# Patient Record
Sex: FEMALE | Race: OTHER | HISPANIC OR LATINO
[De-identification: names, ages, dates, MRNs, and addresses within clinical notes are randomized per-mention and may not be internally consistent; named-entity substitution may affect disease eponyms.]

---

## 2019-01-22 PROBLEM — Z00.00 ENCOUNTER FOR PREVENTIVE HEALTH EXAMINATION: Status: ACTIVE | Noted: 2019-01-22

## 2019-01-24 ENCOUNTER — APPOINTMENT (OUTPATIENT)
Dept: PULMONOLOGY | Facility: CLINIC | Age: 69
End: 2019-01-24

## 2019-07-05 ENCOUNTER — APPOINTMENT (OUTPATIENT)
Dept: CARDIOLOGY | Facility: CLINIC | Age: 69
End: 2019-07-05

## 2021-01-01 ENCOUNTER — APPOINTMENT (OUTPATIENT)
Dept: DISASTER EMERGENCY | Facility: OTHER | Age: 71
End: 2021-01-01
Payer: MEDICARE

## 2021-01-01 ENCOUNTER — APPOINTMENT (OUTPATIENT)
Dept: HEART FAILURE | Facility: CLINIC | Age: 71
End: 2021-01-01
Payer: MEDICARE

## 2021-01-01 ENCOUNTER — APPOINTMENT (OUTPATIENT)
Dept: CARDIOTHORACIC SURGERY | Facility: CLINIC | Age: 71
End: 2021-01-01
Payer: MEDICARE

## 2021-01-01 ENCOUNTER — APPOINTMENT (OUTPATIENT)
Dept: HEART FAILURE | Facility: CLINIC | Age: 71
End: 2021-01-01

## 2021-01-01 ENCOUNTER — NON-APPOINTMENT (OUTPATIENT)
Age: 71
End: 2021-01-01

## 2021-01-01 ENCOUNTER — APPOINTMENT (OUTPATIENT)
Dept: CARDIOLOGY | Facility: CLINIC | Age: 71
End: 2021-01-01
Payer: MEDICARE

## 2021-01-01 ENCOUNTER — TRANSCRIPTION ENCOUNTER (OUTPATIENT)
Age: 71
End: 2021-01-01

## 2021-01-01 ENCOUNTER — APPOINTMENT (OUTPATIENT)
Dept: CARE COORDINATION | Facility: HOME HEALTH | Age: 71
End: 2021-01-01
Payer: MEDICARE

## 2021-01-01 ENCOUNTER — INPATIENT (INPATIENT)
Facility: HOSPITAL | Age: 71
LOS: 13 days | Discharge: HOME HEALTH | DRG: 291 | End: 2021-10-14
Attending: HOSPITALIST | Admitting: STUDENT IN AN ORGANIZED HEALTH CARE EDUCATION/TRAINING PROGRAM
Payer: MEDICARE

## 2021-01-01 VITALS
OXYGEN SATURATION: 97 % | RESPIRATION RATE: 18 BRPM | HEART RATE: 75 BPM | DIASTOLIC BLOOD PRESSURE: 64 MMHG | TEMPERATURE: 98.5 F | BODY MASS INDEX: 36.19 KG/M2 | HEIGHT: 64 IN | SYSTOLIC BLOOD PRESSURE: 108 MMHG | WEIGHT: 212 LBS

## 2021-01-01 VITALS
DIASTOLIC BLOOD PRESSURE: 65 MMHG | OXYGEN SATURATION: 98 % | TEMPERATURE: 97.5 F | SYSTOLIC BLOOD PRESSURE: 118 MMHG | RESPIRATION RATE: 16 BRPM | HEART RATE: 66 BPM

## 2021-01-01 VITALS
RESPIRATION RATE: 17 BRPM | HEART RATE: 68 BPM | SYSTOLIC BLOOD PRESSURE: 108 MMHG | OXYGEN SATURATION: 94 % | TEMPERATURE: 98 F | DIASTOLIC BLOOD PRESSURE: 72 MMHG

## 2021-01-01 VITALS
WEIGHT: 218 LBS | HEIGHT: 64 IN | HEART RATE: 74 BPM | TEMPERATURE: 97.8 F | OXYGEN SATURATION: 96 % | SYSTOLIC BLOOD PRESSURE: 102 MMHG | DIASTOLIC BLOOD PRESSURE: 71 MMHG | BODY MASS INDEX: 37.22 KG/M2 | RESPIRATION RATE: 16 BRPM

## 2021-01-01 VITALS
TEMPERATURE: 98.1 F | RESPIRATION RATE: 16 BRPM | HEART RATE: 68 BPM | DIASTOLIC BLOOD PRESSURE: 62 MMHG | SYSTOLIC BLOOD PRESSURE: 107 MMHG | HEIGHT: 64 IN | BODY MASS INDEX: 36.37 KG/M2 | WEIGHT: 213 LBS | OXYGEN SATURATION: 93 %

## 2021-01-01 VITALS
HEART RATE: 92 BPM | BODY MASS INDEX: 36.22 KG/M2 | WEIGHT: 211 LBS | DIASTOLIC BLOOD PRESSURE: 69 MMHG | SYSTOLIC BLOOD PRESSURE: 106 MMHG

## 2021-01-01 VITALS — HEIGHT: 63 IN | BODY MASS INDEX: 38.62 KG/M2 | WEIGHT: 218 LBS

## 2021-01-01 VITALS
SYSTOLIC BLOOD PRESSURE: 152 MMHG | RESPIRATION RATE: 21 BRPM | OXYGEN SATURATION: 100 % | DIASTOLIC BLOOD PRESSURE: 92 MMHG | WEIGHT: 218.04 LBS | HEART RATE: 72 BPM | TEMPERATURE: 100 F | HEIGHT: 64 IN

## 2021-01-01 VITALS — WEIGHT: 205 LBS | HEIGHT: 64 IN | BODY MASS INDEX: 35 KG/M2

## 2021-01-01 DIAGNOSIS — M25.572 PAIN IN LEFT ANKLE AND JOINTS OF LEFT FOOT: ICD-10-CM

## 2021-01-01 DIAGNOSIS — Z78.9 OTHER SPECIFIED HEALTH STATUS: ICD-10-CM

## 2021-01-01 DIAGNOSIS — I10 ESSENTIAL (PRIMARY) HYPERTENSION: ICD-10-CM

## 2021-01-01 DIAGNOSIS — I05.0 RHEUMATIC MITRAL STENOSIS: ICD-10-CM

## 2021-01-01 DIAGNOSIS — I50.33 ACUTE ON CHRONIC DIASTOLIC (CONGESTIVE) HEART FAILURE: ICD-10-CM

## 2021-01-01 DIAGNOSIS — Z83.3 FAMILY HISTORY OF DIABETES MELLITUS: ICD-10-CM

## 2021-01-01 DIAGNOSIS — K31.819 ANGIODYSPLASIA OF STOMACH AND DUODENUM W/OUT BLEEDING: ICD-10-CM

## 2021-01-01 DIAGNOSIS — E78.5 HYPERLIPIDEMIA, UNSPECIFIED: ICD-10-CM

## 2021-01-01 DIAGNOSIS — I35.0 NONRHEUMATIC AORTIC (VALVE) STENOSIS: ICD-10-CM

## 2021-01-01 DIAGNOSIS — Z87.19 PERSONAL HISTORY OF OTHER DISEASES OF THE DIGESTIVE SYSTEM: ICD-10-CM

## 2021-01-01 DIAGNOSIS — R06.02 SHORTNESS OF BREATH: ICD-10-CM

## 2021-01-01 DIAGNOSIS — Z29.9 ENCOUNTER FOR PROPHYLACTIC MEASURES, UNSPECIFIED: ICD-10-CM

## 2021-01-01 DIAGNOSIS — I07.1 RHEUMATIC TRICUSPID INSUFFICIENCY: ICD-10-CM

## 2021-01-01 DIAGNOSIS — K21.9 GASTRO-ESOPHAGEAL REFLUX DISEASE W/OUT ESOPHAGITIS: ICD-10-CM

## 2021-01-01 DIAGNOSIS — K92.2 GASTROINTESTINAL HEMORRHAGE, UNSPECIFIED: ICD-10-CM

## 2021-01-01 DIAGNOSIS — M10.9 GOUT, UNSPECIFIED: ICD-10-CM

## 2021-01-01 DIAGNOSIS — K31.819 ANGIODYSPLASIA OF STOMACH AND DUODENUM WITHOUT BLEEDING: ICD-10-CM

## 2021-01-01 DIAGNOSIS — E87.8 OTHER DISORDERS OF ELECTROLYTE AND FLUID BALANCE, NOT ELSEWHERE CLASSIFIED: ICD-10-CM

## 2021-01-01 DIAGNOSIS — N17.9 ACUTE KIDNEY FAILURE, UNSPECIFIED: ICD-10-CM

## 2021-01-01 DIAGNOSIS — J96.01 ACUTE RESPIRATORY FAILURE WITH HYPOXIA: ICD-10-CM

## 2021-01-01 DIAGNOSIS — I50.9 HEART FAILURE, UNSPECIFIED: ICD-10-CM

## 2021-01-01 DIAGNOSIS — R50.9 FEVER, UNSPECIFIED: ICD-10-CM

## 2021-01-01 LAB
A1C WITH ESTIMATED AVERAGE GLUCOSE RESULT: 5.8 % — HIGH (ref 4–5.6)
ALBUMIN SERPL ELPH-MCNC: 3.6 G/DL — SIGNIFICANT CHANGE UP (ref 3.3–5)
ALBUMIN SERPL ELPH-MCNC: 3.8 G/DL — SIGNIFICANT CHANGE UP (ref 3.3–5)
ALBUMIN SERPL ELPH-MCNC: 3.9 G/DL — SIGNIFICANT CHANGE UP (ref 3.3–5)
ALBUMIN SERPL ELPH-MCNC: 3.9 G/DL — SIGNIFICANT CHANGE UP (ref 3.3–5)
ALBUMIN SERPL ELPH-MCNC: 4 G/DL
ALBUMIN SERPL ELPH-MCNC: 4 G/DL — SIGNIFICANT CHANGE UP (ref 3.3–5)
ALP BLD-CCNC: 132 U/L
ALP SERPL-CCNC: 132 U/L — HIGH (ref 40–120)
ALP SERPL-CCNC: 149 U/L — HIGH (ref 40–120)
ALP SERPL-CCNC: 154 U/L — HIGH (ref 40–120)
ALP SERPL-CCNC: 156 U/L — HIGH (ref 40–120)
ALP SERPL-CCNC: 184 U/L — HIGH (ref 40–120)
ALT FLD-CCNC: 15 U/L — SIGNIFICANT CHANGE UP (ref 10–45)
ALT FLD-CCNC: 18 U/L — SIGNIFICANT CHANGE UP (ref 10–45)
ALT FLD-CCNC: 21 U/L — SIGNIFICANT CHANGE UP (ref 10–45)
ALT FLD-CCNC: 22 U/L — SIGNIFICANT CHANGE UP (ref 10–45)
ALT FLD-CCNC: 31 U/L — SIGNIFICANT CHANGE UP (ref 10–45)
ALT SERPL-CCNC: 9 U/L
ANION GAP SERPL CALC-SCNC: 13 MMOL/L — SIGNIFICANT CHANGE UP (ref 5–17)
ANION GAP SERPL CALC-SCNC: 14 MMOL/L — SIGNIFICANT CHANGE UP (ref 5–17)
ANION GAP SERPL CALC-SCNC: 15 MMOL/L — SIGNIFICANT CHANGE UP (ref 5–17)
ANION GAP SERPL CALC-SCNC: 15 MMOL/L — SIGNIFICANT CHANGE UP (ref 5–17)
ANION GAP SERPL CALC-SCNC: 16 MMOL/L — SIGNIFICANT CHANGE UP (ref 5–17)
ANION GAP SERPL CALC-SCNC: 17 MMOL/L
ANION GAP SERPL CALC-SCNC: 17 MMOL/L — SIGNIFICANT CHANGE UP (ref 5–17)
ANION GAP SERPL CALC-SCNC: 17 MMOL/L — SIGNIFICANT CHANGE UP (ref 5–17)
ANION GAP SERPL CALC-SCNC: 18 MMOL/L — HIGH (ref 5–17)
ANION GAP SERPL CALC-SCNC: 19 MMOL/L — HIGH (ref 5–17)
ANION GAP SERPL CALC-SCNC: 19 MMOL/L — HIGH (ref 5–17)
APPEARANCE UR: CLEAR — SIGNIFICANT CHANGE UP
APTT BLD: 32.5 SEC — SIGNIFICANT CHANGE UP (ref 27.5–35.5)
APTT BLD: 33.6 SEC — SIGNIFICANT CHANGE UP (ref 27.5–35.5)
AST SERPL-CCNC: 15 U/L
AST SERPL-CCNC: 20 U/L — SIGNIFICANT CHANGE UP (ref 10–40)
AST SERPL-CCNC: 23 U/L — SIGNIFICANT CHANGE UP (ref 10–40)
AST SERPL-CCNC: 30 U/L — SIGNIFICANT CHANGE UP (ref 10–40)
AST SERPL-CCNC: 35 U/L — SIGNIFICANT CHANGE UP (ref 10–40)
AST SERPL-CCNC: 36 U/L — SIGNIFICANT CHANGE UP (ref 10–40)
BACTERIA # UR AUTO: NEGATIVE — SIGNIFICANT CHANGE UP
BASE EXCESS BLDV CALC-SCNC: -2.7 MMOL/L — LOW (ref -2–2)
BASE EXCESS BLDV CALC-SCNC: -4.3 MMOL/L — LOW (ref -2–2)
BASOPHILS # BLD AUTO: 0.03 K/UL — SIGNIFICANT CHANGE UP (ref 0–0.2)
BASOPHILS # BLD AUTO: 0.04 K/UL
BASOPHILS NFR BLD AUTO: 0.5 %
BASOPHILS NFR BLD AUTO: 0.5 % — SIGNIFICANT CHANGE UP (ref 0–2)
BILIRUB SERPL-MCNC: 0.4 MG/DL — SIGNIFICANT CHANGE UP (ref 0.2–1.2)
BILIRUB SERPL-MCNC: 0.4 MG/DL — SIGNIFICANT CHANGE UP (ref 0.2–1.2)
BILIRUB SERPL-MCNC: 0.5 MG/DL — SIGNIFICANT CHANGE UP (ref 0.2–1.2)
BILIRUB SERPL-MCNC: 0.6 MG/DL
BILIRUB SERPL-MCNC: 0.6 MG/DL — SIGNIFICANT CHANGE UP (ref 0.2–1.2)
BILIRUB SERPL-MCNC: 0.8 MG/DL — SIGNIFICANT CHANGE UP (ref 0.2–1.2)
BILIRUB UR-MCNC: NEGATIVE — SIGNIFICANT CHANGE UP
BLD GP AB SCN SERPL QL: NEGATIVE — SIGNIFICANT CHANGE UP
BUN SERPL-MCNC: 32 MG/DL — HIGH (ref 7–23)
BUN SERPL-MCNC: 32 MG/DL — HIGH (ref 7–23)
BUN SERPL-MCNC: 33 MG/DL — HIGH (ref 7–23)
BUN SERPL-MCNC: 38 MG/DL
BUN SERPL-MCNC: 39 MG/DL — HIGH (ref 7–23)
BUN SERPL-MCNC: 48 MG/DL — HIGH (ref 7–23)
BUN SERPL-MCNC: 50 MG/DL — HIGH (ref 7–23)
BUN SERPL-MCNC: 52 MG/DL — HIGH (ref 7–23)
BUN SERPL-MCNC: 53 MG/DL — HIGH (ref 7–23)
BUN SERPL-MCNC: 54 MG/DL — HIGH (ref 7–23)
BUN SERPL-MCNC: 57 MG/DL — HIGH (ref 7–23)
BUN SERPL-MCNC: 57 MG/DL — HIGH (ref 7–23)
BUN SERPL-MCNC: 62 MG/DL — HIGH (ref 7–23)
BUN SERPL-MCNC: 64 MG/DL — HIGH (ref 7–23)
BUN SERPL-MCNC: 66 MG/DL — HIGH (ref 7–23)
CA-I SERPL-SCNC: 1.15 MMOL/L — SIGNIFICANT CHANGE UP (ref 1.15–1.33)
CA-I SERPL-SCNC: 1.17 MMOL/L — SIGNIFICANT CHANGE UP (ref 1.15–1.33)
CALCIUM SERPL-MCNC: 9 MG/DL — SIGNIFICANT CHANGE UP (ref 8.4–10.5)
CALCIUM SERPL-MCNC: 9 MG/DL — SIGNIFICANT CHANGE UP (ref 8.4–10.5)
CALCIUM SERPL-MCNC: 9.2 MG/DL — SIGNIFICANT CHANGE UP (ref 8.4–10.5)
CALCIUM SERPL-MCNC: 9.2 MG/DL — SIGNIFICANT CHANGE UP (ref 8.4–10.5)
CALCIUM SERPL-MCNC: 9.3 MG/DL — SIGNIFICANT CHANGE UP (ref 8.4–10.5)
CALCIUM SERPL-MCNC: 9.4 MG/DL — SIGNIFICANT CHANGE UP (ref 8.4–10.5)
CALCIUM SERPL-MCNC: 9.5 MG/DL
CALCIUM SERPL-MCNC: 9.5 MG/DL — SIGNIFICANT CHANGE UP (ref 8.4–10.5)
CALCIUM SERPL-MCNC: 9.5 MG/DL — SIGNIFICANT CHANGE UP (ref 8.4–10.5)
CALCIUM SERPL-MCNC: 9.6 MG/DL — SIGNIFICANT CHANGE UP (ref 8.4–10.5)
CALCIUM SERPL-MCNC: 9.6 MG/DL — SIGNIFICANT CHANGE UP (ref 8.4–10.5)
CALCIUM SERPL-MCNC: 9.7 MG/DL — SIGNIFICANT CHANGE UP (ref 8.4–10.5)
CALCIUM SERPL-MCNC: 9.7 MG/DL — SIGNIFICANT CHANGE UP (ref 8.4–10.5)
CHLORIDE BLDV-SCNC: 109 MMOL/L — HIGH (ref 96–108)
CHLORIDE BLDV-SCNC: 111 MMOL/L — HIGH (ref 96–108)
CHLORIDE SERPL-SCNC: 102 MMOL/L — SIGNIFICANT CHANGE UP (ref 96–108)
CHLORIDE SERPL-SCNC: 104 MMOL/L — SIGNIFICANT CHANGE UP (ref 96–108)
CHLORIDE SERPL-SCNC: 105 MMOL/L — SIGNIFICANT CHANGE UP (ref 96–108)
CHLORIDE SERPL-SCNC: 106 MMOL/L — SIGNIFICANT CHANGE UP (ref 96–108)
CHLORIDE SERPL-SCNC: 106 MMOL/L — SIGNIFICANT CHANGE UP (ref 96–108)
CHLORIDE SERPL-SCNC: 107 MMOL/L
CHLORIDE SERPL-SCNC: 107 MMOL/L — SIGNIFICANT CHANGE UP (ref 96–108)
CHLORIDE SERPL-SCNC: 112 MMOL/L — HIGH (ref 96–108)
CHLORIDE SERPL-SCNC: 112 MMOL/L — HIGH (ref 96–108)
CHLORIDE SERPL-SCNC: 113 MMOL/L — HIGH (ref 96–108)
CHOLEST SERPL-MCNC: 108 MG/DL — SIGNIFICANT CHANGE UP
CHOLEST SERPL-MCNC: 113 MG/DL
CO2 BLDV-SCNC: 23 MMOL/L — SIGNIFICANT CHANGE UP (ref 22–26)
CO2 BLDV-SCNC: 24 MMOL/L — SIGNIFICANT CHANGE UP (ref 22–26)
CO2 SERPL-SCNC: 18 MMOL/L — LOW (ref 22–31)
CO2 SERPL-SCNC: 19 MMOL/L — LOW (ref 22–31)
CO2 SERPL-SCNC: 20 MMOL/L — LOW (ref 22–31)
CO2 SERPL-SCNC: 21 MMOL/L — LOW (ref 22–31)
CO2 SERPL-SCNC: 22 MMOL/L
CO2 SERPL-SCNC: 22 MMOL/L — SIGNIFICANT CHANGE UP (ref 22–31)
COLOR SPEC: SIGNIFICANT CHANGE UP
COLOR SPEC: YELLOW — SIGNIFICANT CHANGE UP
COLOR SPEC: YELLOW — SIGNIFICANT CHANGE UP
COVID-19 SPIKE DOMAIN AB INTERP: POSITIVE
COVID-19 SPIKE DOMAIN ANTIBODY RESULT: >250 U/ML — HIGH
CREAT SERPL-MCNC: 1.39 MG/DL — HIGH (ref 0.5–1.3)
CREAT SERPL-MCNC: 1.42 MG/DL — HIGH (ref 0.5–1.3)
CREAT SERPL-MCNC: 1.46 MG/DL — HIGH (ref 0.5–1.3)
CREAT SERPL-MCNC: 1.46 MG/DL — HIGH (ref 0.5–1.3)
CREAT SERPL-MCNC: 1.5 MG/DL — HIGH (ref 0.5–1.3)
CREAT SERPL-MCNC: 1.52 MG/DL — HIGH (ref 0.5–1.3)
CREAT SERPL-MCNC: 1.52 MG/DL — HIGH (ref 0.5–1.3)
CREAT SERPL-MCNC: 1.53 MG/DL — HIGH (ref 0.5–1.3)
CREAT SERPL-MCNC: 1.53 MG/DL — HIGH (ref 0.5–1.3)
CREAT SERPL-MCNC: 1.58 MG/DL
CREAT SERPL-MCNC: 1.58 MG/DL — HIGH (ref 0.5–1.3)
CREAT SERPL-MCNC: 1.6 MG/DL — HIGH (ref 0.5–1.3)
CREAT SERPL-MCNC: 1.6 MG/DL — HIGH (ref 0.5–1.3)
CREAT SERPL-MCNC: 1.62 MG/DL — HIGH (ref 0.5–1.3)
CREAT SERPL-MCNC: 1.69 MG/DL — HIGH (ref 0.5–1.3)
CREAT SERPL-MCNC: 1.71 MG/DL — HIGH (ref 0.5–1.3)
CREAT SERPL-MCNC: 2.02 MG/DL — HIGH (ref 0.5–1.3)
CRP SERPL-MCNC: 70 MG/L — HIGH (ref 0–4)
CULTURE RESULTS: SIGNIFICANT CHANGE UP
D DIMER BLD IA.RAPID-MCNC: 327 NG/ML DDU — HIGH
DIFF PNL FLD: ABNORMAL
DIFF PNL FLD: NEGATIVE — SIGNIFICANT CHANGE UP
DIFF PNL FLD: NEGATIVE — SIGNIFICANT CHANGE UP
EOSINOPHIL # BLD AUTO: 0.22 K/UL
EOSINOPHIL # BLD AUTO: 0.29 K/UL — SIGNIFICANT CHANGE UP (ref 0–0.5)
EOSINOPHIL NFR BLD AUTO: 2.7 %
EOSINOPHIL NFR BLD AUTO: 4.4 % — SIGNIFICANT CHANGE UP (ref 0–6)
EPI CELLS # UR: 1 /HPF — SIGNIFICANT CHANGE UP
ERYTHROCYTE [SEDIMENTATION RATE] IN BLOOD: 103 MM/HR — HIGH (ref 0–20)
ERYTHROCYTE [SEDIMENTATION RATE] IN BLOOD: 120 MM/HR — HIGH (ref 0–20)
ESTIMATED AVERAGE GLUCOSE: 120 MG/DL — HIGH (ref 68–114)
ESTIMATED AVERAGE GLUCOSE: 131 MG/DL
FERRITIN SERPL-MCNC: 51 NG/ML
GAS PNL BLDV: 144 MMOL/L — SIGNIFICANT CHANGE UP (ref 136–145)
GAS PNL BLDV: 145 MMOL/L — SIGNIFICANT CHANGE UP (ref 136–145)
GAS PNL BLDV: SIGNIFICANT CHANGE UP
GLUCOSE BLDV-MCNC: 111 MG/DL — HIGH (ref 70–99)
GLUCOSE BLDV-MCNC: 127 MG/DL — HIGH (ref 70–99)
GLUCOSE SERPL-MCNC: 102 MG/DL — HIGH (ref 70–99)
GLUCOSE SERPL-MCNC: 105 MG/DL — HIGH (ref 70–99)
GLUCOSE SERPL-MCNC: 107 MG/DL — HIGH (ref 70–99)
GLUCOSE SERPL-MCNC: 107 MG/DL — HIGH (ref 70–99)
GLUCOSE SERPL-MCNC: 108 MG/DL — HIGH (ref 70–99)
GLUCOSE SERPL-MCNC: 109 MG/DL — HIGH (ref 70–99)
GLUCOSE SERPL-MCNC: 112 MG/DL — HIGH (ref 70–99)
GLUCOSE SERPL-MCNC: 118 MG/DL — HIGH (ref 70–99)
GLUCOSE SERPL-MCNC: 120 MG/DL — HIGH (ref 70–99)
GLUCOSE SERPL-MCNC: 130 MG/DL — HIGH (ref 70–99)
GLUCOSE SERPL-MCNC: 131 MG/DL — HIGH (ref 70–99)
GLUCOSE SERPL-MCNC: 131 MG/DL — HIGH (ref 70–99)
GLUCOSE SERPL-MCNC: 159 MG/DL — HIGH (ref 70–99)
GLUCOSE SERPL-MCNC: 88 MG/DL — SIGNIFICANT CHANGE UP (ref 70–99)
GLUCOSE SERPL-MCNC: 93 MG/DL
GLUCOSE SERPL-MCNC: 96 MG/DL — SIGNIFICANT CHANGE UP (ref 70–99)
GLUCOSE SERPL-MCNC: 97 MG/DL — SIGNIFICANT CHANGE UP (ref 70–99)
GLUCOSE UR QL: NEGATIVE — SIGNIFICANT CHANGE UP
HBA1C MFR BLD HPLC: 6.2 %
HCO3 BLDV-SCNC: 22 MMOL/L — SIGNIFICANT CHANGE UP (ref 22–29)
HCO3 BLDV-SCNC: 23 MMOL/L — SIGNIFICANT CHANGE UP (ref 22–29)
HCT VFR BLD CALC: 35.5 % — SIGNIFICANT CHANGE UP (ref 34.5–45)
HCT VFR BLD CALC: 35.8 % — SIGNIFICANT CHANGE UP (ref 34.5–45)
HCT VFR BLD CALC: 35.8 % — SIGNIFICANT CHANGE UP (ref 34.5–45)
HCT VFR BLD CALC: 36.6 % — SIGNIFICANT CHANGE UP (ref 34.5–45)
HCT VFR BLD CALC: 37.2 % — SIGNIFICANT CHANGE UP (ref 34.5–45)
HCT VFR BLD CALC: 37.3 % — SIGNIFICANT CHANGE UP (ref 34.5–45)
HCT VFR BLD CALC: 37.5 % — SIGNIFICANT CHANGE UP (ref 34.5–45)
HCT VFR BLD CALC: 37.9 % — SIGNIFICANT CHANGE UP (ref 34.5–45)
HCT VFR BLD CALC: 38.1 % — SIGNIFICANT CHANGE UP (ref 34.5–45)
HCT VFR BLD CALC: 38.2 % — SIGNIFICANT CHANGE UP (ref 34.5–45)
HCT VFR BLD CALC: 38.6 % — SIGNIFICANT CHANGE UP (ref 34.5–45)
HCT VFR BLD CALC: 39.2 % — SIGNIFICANT CHANGE UP (ref 34.5–45)
HCT VFR BLD CALC: 39.2 % — SIGNIFICANT CHANGE UP (ref 34.5–45)
HCT VFR BLD CALC: 42 % — SIGNIFICANT CHANGE UP (ref 34.5–45)
HCT VFR BLD CALC: 44.3 %
HCT VFR BLDA CALC: 36 % — SIGNIFICANT CHANGE UP (ref 34.5–46.5)
HCT VFR BLDA CALC: 37 % — SIGNIFICANT CHANGE UP (ref 34.5–46.5)
HCV AB S/CO SERPL IA: 0.12 S/CO — SIGNIFICANT CHANGE UP (ref 0–0.99)
HCV AB SERPL-IMP: SIGNIFICANT CHANGE UP
HDLC SERPL-MCNC: 35 MG/DL — LOW
HDLC SERPL-MCNC: 37 MG/DL
HGB BLD CALC-MCNC: 11.9 G/DL — SIGNIFICANT CHANGE UP (ref 11.7–16.1)
HGB BLD CALC-MCNC: 12.3 G/DL — SIGNIFICANT CHANGE UP (ref 11.7–16.1)
HGB BLD-MCNC: 11 G/DL — LOW (ref 11.5–15.5)
HGB BLD-MCNC: 11 G/DL — LOW (ref 11.5–15.5)
HGB BLD-MCNC: 11.1 G/DL — LOW (ref 11.5–15.5)
HGB BLD-MCNC: 11.1 G/DL — LOW (ref 11.5–15.5)
HGB BLD-MCNC: 11.2 G/DL — LOW (ref 11.5–15.5)
HGB BLD-MCNC: 11.3 G/DL — LOW (ref 11.5–15.5)
HGB BLD-MCNC: 11.6 G/DL — SIGNIFICANT CHANGE UP (ref 11.5–15.5)
HGB BLD-MCNC: 11.6 G/DL — SIGNIFICANT CHANGE UP (ref 11.5–15.5)
HGB BLD-MCNC: 11.8 G/DL — SIGNIFICANT CHANGE UP (ref 11.5–15.5)
HGB BLD-MCNC: 11.9 G/DL — SIGNIFICANT CHANGE UP (ref 11.5–15.5)
HGB BLD-MCNC: 11.9 G/DL — SIGNIFICANT CHANGE UP (ref 11.5–15.5)
HGB BLD-MCNC: 12 G/DL — SIGNIFICANT CHANGE UP (ref 11.5–15.5)
HGB BLD-MCNC: 12.1 G/DL — SIGNIFICANT CHANGE UP (ref 11.5–15.5)
HGB BLD-MCNC: 13 G/DL
HGB BLD-MCNC: 13.1 G/DL — SIGNIFICANT CHANGE UP (ref 11.5–15.5)
HYALINE CASTS # UR AUTO: 1 /LPF — SIGNIFICANT CHANGE UP (ref 0–2)
IMM GRANULOCYTES NFR BLD AUTO: 0.5 %
IMM GRANULOCYTES NFR BLD AUTO: 0.5 % — SIGNIFICANT CHANGE UP (ref 0–1.5)
INR BLD: 1.12 RATIO — SIGNIFICANT CHANGE UP (ref 0.88–1.16)
INR BLD: 1.16 RATIO — SIGNIFICANT CHANGE UP (ref 0.88–1.16)
IRON SATN MFR SERPL: 18 %
IRON SERPL-MCNC: 62 UG/DL
KETONES UR-MCNC: NEGATIVE — SIGNIFICANT CHANGE UP
LACTATE BLDV-MCNC: 1.4 MMOL/L — SIGNIFICANT CHANGE UP (ref 0.7–2)
LACTATE BLDV-MCNC: 2.5 MMOL/L — HIGH (ref 0.7–2)
LDLC SERPL CALC-MCNC: 55 MG/DL
LEUKOCYTE ESTERASE UR-ACNC: NEGATIVE — SIGNIFICANT CHANGE UP
LIPID PNL WITH DIRECT LDL SERPL: 51 MG/DL — SIGNIFICANT CHANGE UP
LYMPHOCYTES # BLD AUTO: 1.81 K/UL
LYMPHOCYTES # BLD AUTO: 1.84 K/UL — SIGNIFICANT CHANGE UP (ref 1–3.3)
LYMPHOCYTES # BLD AUTO: 27.6 % — SIGNIFICANT CHANGE UP (ref 13–44)
LYMPHOCYTES NFR BLD AUTO: 21.9 %
MAGNESIUM SERPL-MCNC: 1.2 MG/DL — LOW (ref 1.6–2.6)
MAGNESIUM SERPL-MCNC: 1.3 MG/DL — LOW (ref 1.6–2.6)
MAGNESIUM SERPL-MCNC: 1.6 MG/DL — SIGNIFICANT CHANGE UP (ref 1.6–2.6)
MAGNESIUM SERPL-MCNC: 1.7 MG/DL — SIGNIFICANT CHANGE UP (ref 1.6–2.6)
MAGNESIUM SERPL-MCNC: 1.7 MG/DL — SIGNIFICANT CHANGE UP (ref 1.6–2.6)
MAGNESIUM SERPL-MCNC: 1.8 MG/DL — SIGNIFICANT CHANGE UP (ref 1.6–2.6)
MAGNESIUM SERPL-MCNC: 1.9 MG/DL — SIGNIFICANT CHANGE UP (ref 1.6–2.6)
MAGNESIUM SERPL-MCNC: 1.9 MG/DL — SIGNIFICANT CHANGE UP (ref 1.6–2.6)
MAGNESIUM SERPL-MCNC: 2 MG/DL — SIGNIFICANT CHANGE UP (ref 1.6–2.6)
MAN DIFF?: NORMAL
MCHC RBC-ENTMCNC: 26.3 PG — LOW (ref 27–34)
MCHC RBC-ENTMCNC: 26.6 PG
MCHC RBC-ENTMCNC: 26.6 PG — LOW (ref 27–34)
MCHC RBC-ENTMCNC: 26.7 PG — LOW (ref 27–34)
MCHC RBC-ENTMCNC: 26.7 PG — LOW (ref 27–34)
MCHC RBC-ENTMCNC: 26.8 PG — LOW (ref 27–34)
MCHC RBC-ENTMCNC: 26.9 PG — LOW (ref 27–34)
MCHC RBC-ENTMCNC: 27.2 PG — SIGNIFICANT CHANGE UP (ref 27–34)
MCHC RBC-ENTMCNC: 27.2 PG — SIGNIFICANT CHANGE UP (ref 27–34)
MCHC RBC-ENTMCNC: 27.3 PG — SIGNIFICANT CHANGE UP (ref 27–34)
MCHC RBC-ENTMCNC: 27.3 PG — SIGNIFICANT CHANGE UP (ref 27–34)
MCHC RBC-ENTMCNC: 27.4 PG — SIGNIFICANT CHANGE UP (ref 27–34)
MCHC RBC-ENTMCNC: 28.2 PG — SIGNIFICANT CHANGE UP (ref 27–34)
MCHC RBC-ENTMCNC: 29.3 GM/DL
MCHC RBC-ENTMCNC: 29.8 GM/DL — LOW (ref 32–36)
MCHC RBC-ENTMCNC: 30.1 GM/DL — LOW (ref 32–36)
MCHC RBC-ENTMCNC: 30.4 GM/DL — LOW (ref 32–36)
MCHC RBC-ENTMCNC: 30.6 GM/DL — LOW (ref 32–36)
MCHC RBC-ENTMCNC: 30.7 GM/DL — LOW (ref 32–36)
MCHC RBC-ENTMCNC: 30.7 GM/DL — LOW (ref 32–36)
MCHC RBC-ENTMCNC: 30.9 GM/DL — LOW (ref 32–36)
MCHC RBC-ENTMCNC: 31.1 GM/DL — LOW (ref 32–36)
MCHC RBC-ENTMCNC: 31.2 GM/DL — LOW (ref 32–36)
MCHC RBC-ENTMCNC: 31.2 GM/DL — LOW (ref 32–36)
MCHC RBC-ENTMCNC: 31.3 GM/DL — LOW (ref 32–36)
MCHC RBC-ENTMCNC: 31.3 GM/DL — LOW (ref 32–36)
MCV RBC AUTO: 85.9 FL — SIGNIFICANT CHANGE UP (ref 80–100)
MCV RBC AUTO: 86.7 FL — SIGNIFICANT CHANGE UP (ref 80–100)
MCV RBC AUTO: 87.1 FL — SIGNIFICANT CHANGE UP (ref 80–100)
MCV RBC AUTO: 87.3 FL — SIGNIFICANT CHANGE UP (ref 80–100)
MCV RBC AUTO: 87.4 FL — SIGNIFICANT CHANGE UP (ref 80–100)
MCV RBC AUTO: 87.5 FL — SIGNIFICANT CHANGE UP (ref 80–100)
MCV RBC AUTO: 87.6 FL — SIGNIFICANT CHANGE UP (ref 80–100)
MCV RBC AUTO: 87.6 FL — SIGNIFICANT CHANGE UP (ref 80–100)
MCV RBC AUTO: 87.9 FL — SIGNIFICANT CHANGE UP (ref 80–100)
MCV RBC AUTO: 88 FL — SIGNIFICANT CHANGE UP (ref 80–100)
MCV RBC AUTO: 88.2 FL — SIGNIFICANT CHANGE UP (ref 80–100)
MCV RBC AUTO: 88.7 FL — SIGNIFICANT CHANGE UP (ref 80–100)
MCV RBC AUTO: 90.1 FL — SIGNIFICANT CHANGE UP (ref 80–100)
MCV RBC AUTO: 90.3 FL — SIGNIFICANT CHANGE UP (ref 80–100)
MCV RBC AUTO: 90.6 FL
MONOCYTES # BLD AUTO: 0.31 K/UL — SIGNIFICANT CHANGE UP (ref 0–0.9)
MONOCYTES # BLD AUTO: 0.54 K/UL
MONOCYTES NFR BLD AUTO: 4.7 % — SIGNIFICANT CHANGE UP (ref 2–14)
MONOCYTES NFR BLD AUTO: 6.5 %
MRSA PCR RESULT.: SIGNIFICANT CHANGE UP
NEUTROPHILS # BLD AUTO: 4.16 K/UL — SIGNIFICANT CHANGE UP (ref 1.8–7.4)
NEUTROPHILS # BLD AUTO: 5.62 K/UL
NEUTROPHILS NFR BLD AUTO: 62.3 % — SIGNIFICANT CHANGE UP (ref 43–77)
NEUTROPHILS NFR BLD AUTO: 67.9 %
NITRITE UR-MCNC: NEGATIVE — SIGNIFICANT CHANGE UP
NON HDL CHOLESTEROL: 73 MG/DL — SIGNIFICANT CHANGE UP
NONHDLC SERPL-MCNC: 76 MG/DL
NRBC # BLD: 0 /100 WBCS — SIGNIFICANT CHANGE UP (ref 0–0)
NT-PROBNP SERPL-MCNC: ABNORMAL PG/ML
NT-PROBNP SERPL-SCNC: 4573 PG/ML — HIGH (ref 0–300)
NT-PROBNP SERPL-SCNC: 6580 PG/ML — HIGH (ref 0–300)
NT-PROBNP SERPL-SCNC: HIGH PG/ML (ref 0–300)
NT-PROBNP SERPL-SCNC: HIGH PG/ML (ref 0–300)
PCO2 BLDV: 40 MMHG — SIGNIFICANT CHANGE UP (ref 39–42)
PCO2 BLDV: 42 MMHG — SIGNIFICANT CHANGE UP (ref 39–42)
PH BLDV: 7.32 — SIGNIFICANT CHANGE UP (ref 7.32–7.43)
PH BLDV: 7.36 — SIGNIFICANT CHANGE UP (ref 7.32–7.43)
PH UR: 5.5 — SIGNIFICANT CHANGE UP (ref 5–8)
PHOSPHATE SERPL-MCNC: 3.4 MG/DL — SIGNIFICANT CHANGE UP (ref 2.5–4.5)
PHOSPHATE SERPL-MCNC: 3.4 MG/DL — SIGNIFICANT CHANGE UP (ref 2.5–4.5)
PHOSPHATE SERPL-MCNC: 3.5 MG/DL — SIGNIFICANT CHANGE UP (ref 2.5–4.5)
PHOSPHATE SERPL-MCNC: 3.6 MG/DL — SIGNIFICANT CHANGE UP (ref 2.5–4.5)
PHOSPHATE SERPL-MCNC: 4.4 MG/DL — SIGNIFICANT CHANGE UP (ref 2.5–4.5)
PLATELET # BLD AUTO: 223 K/UL — SIGNIFICANT CHANGE UP (ref 150–400)
PLATELET # BLD AUTO: 227 K/UL — SIGNIFICANT CHANGE UP (ref 150–400)
PLATELET # BLD AUTO: 232 K/UL — SIGNIFICANT CHANGE UP (ref 150–400)
PLATELET # BLD AUTO: 233 K/UL — SIGNIFICANT CHANGE UP (ref 150–400)
PLATELET # BLD AUTO: 235 K/UL — SIGNIFICANT CHANGE UP (ref 150–400)
PLATELET # BLD AUTO: 237 K/UL — SIGNIFICANT CHANGE UP (ref 150–400)
PLATELET # BLD AUTO: 244 K/UL — SIGNIFICANT CHANGE UP (ref 150–400)
PLATELET # BLD AUTO: 245 K/UL
PLATELET # BLD AUTO: 249 K/UL — SIGNIFICANT CHANGE UP (ref 150–400)
PLATELET # BLD AUTO: 249 K/UL — SIGNIFICANT CHANGE UP (ref 150–400)
PLATELET # BLD AUTO: 275 K/UL — SIGNIFICANT CHANGE UP (ref 150–400)
PLATELET # BLD AUTO: 317 K/UL — SIGNIFICANT CHANGE UP (ref 150–400)
PLATELET # BLD AUTO: 337 K/UL — SIGNIFICANT CHANGE UP (ref 150–400)
PLATELET # BLD AUTO: 353 K/UL — SIGNIFICANT CHANGE UP (ref 150–400)
PLATELET # BLD AUTO: 364 K/UL — SIGNIFICANT CHANGE UP (ref 150–400)
PO2 BLDV: 31 MMHG — SIGNIFICANT CHANGE UP (ref 25–45)
PO2 BLDV: 41 MMHG — SIGNIFICANT CHANGE UP (ref 25–45)
POTASSIUM BLDV-SCNC: 4.2 MMOL/L — SIGNIFICANT CHANGE UP (ref 3.5–5.1)
POTASSIUM BLDV-SCNC: 4.3 MMOL/L — SIGNIFICANT CHANGE UP (ref 3.5–5.1)
POTASSIUM SERPL-MCNC: 3.7 MMOL/L — SIGNIFICANT CHANGE UP (ref 3.5–5.3)
POTASSIUM SERPL-MCNC: 3.8 MMOL/L — SIGNIFICANT CHANGE UP (ref 3.5–5.3)
POTASSIUM SERPL-MCNC: 3.8 MMOL/L — SIGNIFICANT CHANGE UP (ref 3.5–5.3)
POTASSIUM SERPL-MCNC: 3.9 MMOL/L — SIGNIFICANT CHANGE UP (ref 3.5–5.3)
POTASSIUM SERPL-MCNC: 3.9 MMOL/L — SIGNIFICANT CHANGE UP (ref 3.5–5.3)
POTASSIUM SERPL-MCNC: 4 MMOL/L — SIGNIFICANT CHANGE UP (ref 3.5–5.3)
POTASSIUM SERPL-MCNC: 4 MMOL/L — SIGNIFICANT CHANGE UP (ref 3.5–5.3)
POTASSIUM SERPL-MCNC: 4.1 MMOL/L — SIGNIFICANT CHANGE UP (ref 3.5–5.3)
POTASSIUM SERPL-MCNC: 4.1 MMOL/L — SIGNIFICANT CHANGE UP (ref 3.5–5.3)
POTASSIUM SERPL-MCNC: 4.2 MMOL/L — SIGNIFICANT CHANGE UP (ref 3.5–5.3)
POTASSIUM SERPL-MCNC: 4.3 MMOL/L — SIGNIFICANT CHANGE UP (ref 3.5–5.3)
POTASSIUM SERPL-MCNC: 4.5 MMOL/L — SIGNIFICANT CHANGE UP (ref 3.5–5.3)
POTASSIUM SERPL-MCNC: 4.6 MMOL/L — SIGNIFICANT CHANGE UP (ref 3.5–5.3)
POTASSIUM SERPL-MCNC: 4.6 MMOL/L — SIGNIFICANT CHANGE UP (ref 3.5–5.3)
POTASSIUM SERPL-SCNC: 3.7 MMOL/L — SIGNIFICANT CHANGE UP (ref 3.5–5.3)
POTASSIUM SERPL-SCNC: 3.8 MMOL/L — SIGNIFICANT CHANGE UP (ref 3.5–5.3)
POTASSIUM SERPL-SCNC: 3.8 MMOL/L — SIGNIFICANT CHANGE UP (ref 3.5–5.3)
POTASSIUM SERPL-SCNC: 3.9 MMOL/L — SIGNIFICANT CHANGE UP (ref 3.5–5.3)
POTASSIUM SERPL-SCNC: 3.9 MMOL/L — SIGNIFICANT CHANGE UP (ref 3.5–5.3)
POTASSIUM SERPL-SCNC: 4 MMOL/L — SIGNIFICANT CHANGE UP (ref 3.5–5.3)
POTASSIUM SERPL-SCNC: 4 MMOL/L — SIGNIFICANT CHANGE UP (ref 3.5–5.3)
POTASSIUM SERPL-SCNC: 4.1 MMOL/L
POTASSIUM SERPL-SCNC: 4.1 MMOL/L — SIGNIFICANT CHANGE UP (ref 3.5–5.3)
POTASSIUM SERPL-SCNC: 4.1 MMOL/L — SIGNIFICANT CHANGE UP (ref 3.5–5.3)
POTASSIUM SERPL-SCNC: 4.2 MMOL/L — SIGNIFICANT CHANGE UP (ref 3.5–5.3)
POTASSIUM SERPL-SCNC: 4.3 MMOL/L — SIGNIFICANT CHANGE UP (ref 3.5–5.3)
POTASSIUM SERPL-SCNC: 4.5 MMOL/L — SIGNIFICANT CHANGE UP (ref 3.5–5.3)
POTASSIUM SERPL-SCNC: 4.6 MMOL/L — SIGNIFICANT CHANGE UP (ref 3.5–5.3)
POTASSIUM SERPL-SCNC: 4.6 MMOL/L — SIGNIFICANT CHANGE UP (ref 3.5–5.3)
PROCALCITONIN SERPL-MCNC: 0.1 NG/ML — SIGNIFICANT CHANGE UP (ref 0.02–0.1)
PROT SERPL-MCNC: 6.5 G/DL — SIGNIFICANT CHANGE UP (ref 6–8.3)
PROT SERPL-MCNC: 6.8 G/DL
PROT SERPL-MCNC: 7 G/DL — SIGNIFICANT CHANGE UP (ref 6–8.3)
PROT SERPL-MCNC: 7.1 G/DL — SIGNIFICANT CHANGE UP (ref 6–8.3)
PROT SERPL-MCNC: 7.3 G/DL — SIGNIFICANT CHANGE UP (ref 6–8.3)
PROT SERPL-MCNC: 7.4 G/DL — SIGNIFICANT CHANGE UP (ref 6–8.3)
PROT UR-MCNC: ABNORMAL
PROT UR-MCNC: NEGATIVE — SIGNIFICANT CHANGE UP
PROT UR-MCNC: NEGATIVE — SIGNIFICANT CHANGE UP
PROTHROM AB SERPL-ACNC: 13.4 SEC — SIGNIFICANT CHANGE UP (ref 10.6–13.6)
PROTHROM AB SERPL-ACNC: 13.8 SEC — HIGH (ref 10.6–13.6)
RBC # BLD: 4.06 M/UL — SIGNIFICANT CHANGE UP (ref 3.8–5.2)
RBC # BLD: 4.09 M/UL — SIGNIFICANT CHANGE UP (ref 3.8–5.2)
RBC # BLD: 4.13 M/UL — SIGNIFICANT CHANGE UP (ref 3.8–5.2)
RBC # BLD: 4.16 M/UL — SIGNIFICANT CHANGE UP (ref 3.8–5.2)
RBC # BLD: 4.18 M/UL — SIGNIFICANT CHANGE UP (ref 3.8–5.2)
RBC # BLD: 4.22 M/UL — SIGNIFICANT CHANGE UP (ref 3.8–5.2)
RBC # BLD: 4.34 M/UL — SIGNIFICANT CHANGE UP (ref 3.8–5.2)
RBC # BLD: 4.35 M/UL — SIGNIFICANT CHANGE UP (ref 3.8–5.2)
RBC # BLD: 4.42 M/UL — SIGNIFICANT CHANGE UP (ref 3.8–5.2)
RBC # BLD: 4.43 M/UL — SIGNIFICANT CHANGE UP (ref 3.8–5.2)
RBC # BLD: 4.46 M/UL — SIGNIFICANT CHANGE UP (ref 3.8–5.2)
RBC # BLD: 4.65 M/UL — SIGNIFICANT CHANGE UP (ref 3.8–5.2)
RBC # BLD: 4.89 M/UL
RBC # FLD: 15.3 % — HIGH (ref 10.3–14.5)
RBC # FLD: 15.4 % — HIGH (ref 10.3–14.5)
RBC # FLD: 15.5 % — HIGH (ref 10.3–14.5)
RBC # FLD: 15.5 % — HIGH (ref 10.3–14.5)
RBC # FLD: 15.6 % — HIGH (ref 10.3–14.5)
RBC # FLD: 15.8 % — HIGH (ref 10.3–14.5)
RBC # FLD: 15.8 % — HIGH (ref 10.3–14.5)
RBC # FLD: 15.9 % — HIGH (ref 10.3–14.5)
RBC # FLD: 17.8 %
RBC CASTS # UR COMP ASSIST: 10 /HPF — HIGH (ref 0–4)
RH IG SCN BLD-IMP: POSITIVE — SIGNIFICANT CHANGE UP
RHEUMATOID FACT SERPL-ACNC: 12 IU/ML — SIGNIFICANT CHANGE UP (ref 0–13)
S AUREUS DNA NOSE QL NAA+PROBE: DETECTED
SAO2 % BLDV: 45.5 % — LOW (ref 67–88)
SAO2 % BLDV: 63.2 % — LOW (ref 67–88)
SARS-COV-2 IGG+IGM SERPL QL IA: >250 U/ML — HIGH
SARS-COV-2 IGG+IGM SERPL QL IA: POSITIVE
SARS-COV-2 RNA SPEC QL NAA+PROBE: SIGNIFICANT CHANGE UP
SODIUM SERPL-SCNC: 139 MMOL/L — SIGNIFICANT CHANGE UP (ref 135–145)
SODIUM SERPL-SCNC: 140 MMOL/L — SIGNIFICANT CHANGE UP (ref 135–145)
SODIUM SERPL-SCNC: 141 MMOL/L — SIGNIFICANT CHANGE UP (ref 135–145)
SODIUM SERPL-SCNC: 142 MMOL/L — SIGNIFICANT CHANGE UP (ref 135–145)
SODIUM SERPL-SCNC: 143 MMOL/L — SIGNIFICANT CHANGE UP (ref 135–145)
SODIUM SERPL-SCNC: 143 MMOL/L — SIGNIFICANT CHANGE UP (ref 135–145)
SODIUM SERPL-SCNC: 144 MMOL/L — SIGNIFICANT CHANGE UP (ref 135–145)
SODIUM SERPL-SCNC: 144 MMOL/L — SIGNIFICANT CHANGE UP (ref 135–145)
SODIUM SERPL-SCNC: 146 MMOL/L
SODIUM SERPL-SCNC: 147 MMOL/L — HIGH (ref 135–145)
SP GR SPEC: 1.02 — SIGNIFICANT CHANGE UP (ref 1.01–1.02)
SPECIMEN SOURCE: SIGNIFICANT CHANGE UP
TIBC SERPL-MCNC: 334 UG/DL
TRIGL SERPL-MCNC: 106 MG/DL
TRIGL SERPL-MCNC: 107 MG/DL — SIGNIFICANT CHANGE UP
TROPONIN T, HIGH SENSITIVITY RESULT: 19 NG/L — SIGNIFICANT CHANGE UP (ref 0–51)
TROPONIN T, HIGH SENSITIVITY RESULT: 20 NG/L — SIGNIFICANT CHANGE UP (ref 0–51)
TSH RECEP AB FLD-ACNC: <1.1 IU/L — SIGNIFICANT CHANGE UP (ref 0–1.75)
TSH SERPL-ACNC: 2.28 UIU/ML
UIBC SERPL-MCNC: 272 UG/DL
URATE SERPL-MCNC: 12.3 MG/DL
UROBILINOGEN FLD QL: NEGATIVE — SIGNIFICANT CHANGE UP
WBC # BLD: 10.5 K/UL — SIGNIFICANT CHANGE UP (ref 3.8–10.5)
WBC # BLD: 5.69 K/UL — SIGNIFICANT CHANGE UP (ref 3.8–10.5)
WBC # BLD: 6.66 K/UL — SIGNIFICANT CHANGE UP (ref 3.8–10.5)
WBC # BLD: 6.66 K/UL — SIGNIFICANT CHANGE UP (ref 3.8–10.5)
WBC # BLD: 6.67 K/UL — SIGNIFICANT CHANGE UP (ref 3.8–10.5)
WBC # BLD: 8 K/UL — SIGNIFICANT CHANGE UP (ref 3.8–10.5)
WBC # BLD: 8.18 K/UL — SIGNIFICANT CHANGE UP (ref 3.8–10.5)
WBC # BLD: 8.31 K/UL — SIGNIFICANT CHANGE UP (ref 3.8–10.5)
WBC # BLD: 8.41 K/UL — SIGNIFICANT CHANGE UP (ref 3.8–10.5)
WBC # BLD: 8.52 K/UL — SIGNIFICANT CHANGE UP (ref 3.8–10.5)
WBC # BLD: 8.56 K/UL — SIGNIFICANT CHANGE UP (ref 3.8–10.5)
WBC # BLD: 9.23 K/UL — SIGNIFICANT CHANGE UP (ref 3.8–10.5)
WBC # BLD: 9.31 K/UL — SIGNIFICANT CHANGE UP (ref 3.8–10.5)
WBC # BLD: 9.66 K/UL — SIGNIFICANT CHANGE UP (ref 3.8–10.5)
WBC # FLD AUTO: 10.5 K/UL — SIGNIFICANT CHANGE UP (ref 3.8–10.5)
WBC # FLD AUTO: 5.69 K/UL — SIGNIFICANT CHANGE UP (ref 3.8–10.5)
WBC # FLD AUTO: 6.66 K/UL — SIGNIFICANT CHANGE UP (ref 3.8–10.5)
WBC # FLD AUTO: 6.66 K/UL — SIGNIFICANT CHANGE UP (ref 3.8–10.5)
WBC # FLD AUTO: 6.67 K/UL — SIGNIFICANT CHANGE UP (ref 3.8–10.5)
WBC # FLD AUTO: 8 K/UL — SIGNIFICANT CHANGE UP (ref 3.8–10.5)
WBC # FLD AUTO: 8.18 K/UL — SIGNIFICANT CHANGE UP (ref 3.8–10.5)
WBC # FLD AUTO: 8.27 K/UL
WBC # FLD AUTO: 8.31 K/UL — SIGNIFICANT CHANGE UP (ref 3.8–10.5)
WBC # FLD AUTO: 8.41 K/UL — SIGNIFICANT CHANGE UP (ref 3.8–10.5)
WBC # FLD AUTO: 8.52 K/UL — SIGNIFICANT CHANGE UP (ref 3.8–10.5)
WBC # FLD AUTO: 8.56 K/UL — SIGNIFICANT CHANGE UP (ref 3.8–10.5)
WBC # FLD AUTO: 9.23 K/UL — SIGNIFICANT CHANGE UP (ref 3.8–10.5)
WBC # FLD AUTO: 9.31 K/UL — SIGNIFICANT CHANGE UP (ref 3.8–10.5)
WBC # FLD AUTO: 9.66 K/UL — SIGNIFICANT CHANGE UP (ref 3.8–10.5)
WBC UR QL: 1 /HPF — SIGNIFICANT CHANGE UP (ref 0–5)

## 2021-01-01 PROCEDURE — 93880 EXTRACRANIAL BILAT STUDY: CPT

## 2021-01-01 PROCEDURE — 93970 EXTREMITY STUDY: CPT | Mod: 26

## 2021-01-01 PROCEDURE — 73630 X-RAY EXAM OF FOOT: CPT | Mod: 26,LT

## 2021-01-01 PROCEDURE — 93880 EXTRACRANIAL BILAT STUDY: CPT | Mod: 26

## 2021-01-01 PROCEDURE — 99233 SBSQ HOSP IP/OBS HIGH 50: CPT

## 2021-01-01 PROCEDURE — 99232 SBSQ HOSP IP/OBS MODERATE 35: CPT | Mod: GC

## 2021-01-01 PROCEDURE — 85730 THROMBOPLASTIN TIME PARTIAL: CPT

## 2021-01-01 PROCEDURE — 99233 SBSQ HOSP IP/OBS HIGH 50: CPT | Mod: GC

## 2021-01-01 PROCEDURE — 86769 SARS-COV-2 COVID-19 ANTIBODY: CPT

## 2021-01-01 PROCEDURE — 87640 STAPH A DNA AMP PROBE: CPT

## 2021-01-01 PROCEDURE — 93308 TTE F-UP OR LMTD: CPT

## 2021-01-01 PROCEDURE — 93971 EXTREMITY STUDY: CPT | Mod: 26,LT

## 2021-01-01 PROCEDURE — 93306 TTE W/DOPPLER COMPLETE: CPT

## 2021-01-01 PROCEDURE — 85025 COMPLETE CBC W/AUTO DIFF WBC: CPT

## 2021-01-01 PROCEDURE — 85379 FIBRIN DEGRADATION QUANT: CPT

## 2021-01-01 PROCEDURE — 80061 LIPID PANEL: CPT

## 2021-01-01 PROCEDURE — 99203 OFFICE O/P NEW LOW 30 MIN: CPT

## 2021-01-01 PROCEDURE — U0003: CPT

## 2021-01-01 PROCEDURE — 97162 PT EVAL MOD COMPLEX 30 MIN: CPT

## 2021-01-01 PROCEDURE — 85610 PROTHROMBIN TIME: CPT

## 2021-01-01 PROCEDURE — 83036 HEMOGLOBIN GLYCOSYLATED A1C: CPT

## 2021-01-01 PROCEDURE — 99442: CPT | Mod: 95

## 2021-01-01 PROCEDURE — 83605 ASSAY OF LACTIC ACID: CPT

## 2021-01-01 PROCEDURE — 99205 OFFICE O/P NEW HI 60 MIN: CPT

## 2021-01-01 PROCEDURE — 81001 URINALYSIS AUTO W/SCOPE: CPT

## 2021-01-01 PROCEDURE — ZZZZZ: CPT

## 2021-01-01 PROCEDURE — 85014 HEMATOCRIT: CPT

## 2021-01-01 PROCEDURE — 81003 URINALYSIS AUTO W/O SCOPE: CPT

## 2021-01-01 PROCEDURE — 99291 CRITICAL CARE FIRST HOUR: CPT

## 2021-01-01 PROCEDURE — 94010 BREATHING CAPACITY TEST: CPT | Mod: 26

## 2021-01-01 PROCEDURE — 86431 RHEUMATOID FACTOR QUANT: CPT

## 2021-01-01 PROCEDURE — 71045 X-RAY EXAM CHEST 1 VIEW: CPT | Mod: 26

## 2021-01-01 PROCEDURE — 84145 PROCALCITONIN (PCT): CPT

## 2021-01-01 PROCEDURE — 86803 HEPATITIS C AB TEST: CPT

## 2021-01-01 PROCEDURE — 99344 HOME/RES VST NEW MOD MDM 60: CPT

## 2021-01-01 PROCEDURE — 86901 BLOOD TYPING SEROLOGIC RH(D): CPT

## 2021-01-01 PROCEDURE — 82803 BLOOD GASES ANY COMBINATION: CPT

## 2021-01-01 PROCEDURE — 93005 ELECTROCARDIOGRAM TRACING: CPT

## 2021-01-01 PROCEDURE — 80053 COMPREHEN METABOLIC PANEL: CPT

## 2021-01-01 PROCEDURE — 97110 THERAPEUTIC EXERCISES: CPT

## 2021-01-01 PROCEDURE — 76376 3D RENDER W/INTRP POSTPROCES: CPT | Mod: 26

## 2021-01-01 PROCEDURE — 0002A: CPT

## 2021-01-01 PROCEDURE — 87040 BLOOD CULTURE FOR BACTERIA: CPT

## 2021-01-01 PROCEDURE — 99223 1ST HOSP IP/OBS HIGH 75: CPT

## 2021-01-01 PROCEDURE — 99232 SBSQ HOSP IP/OBS MODERATE 35: CPT

## 2021-01-01 PROCEDURE — 43255 EGD CONTROL BLEEDING ANY: CPT | Mod: GC

## 2021-01-01 PROCEDURE — 80048 BASIC METABOLIC PNL TOTAL CA: CPT

## 2021-01-01 PROCEDURE — 36569 INSJ PICC 5 YR+ W/O IMAGING: CPT

## 2021-01-01 PROCEDURE — 97530 THERAPEUTIC ACTIVITIES: CPT

## 2021-01-01 PROCEDURE — 84484 ASSAY OF TROPONIN QUANT: CPT

## 2021-01-01 PROCEDURE — 85018 HEMOGLOBIN: CPT

## 2021-01-01 PROCEDURE — 86140 C-REACTIVE PROTEIN: CPT

## 2021-01-01 PROCEDURE — 83520 IMMUNOASSAY QUANT NOS NONAB: CPT

## 2021-01-01 PROCEDURE — 99214 OFFICE O/P EST MOD 30 MIN: CPT

## 2021-01-01 PROCEDURE — 87086 URINE CULTURE/COLONY COUNT: CPT

## 2021-01-01 PROCEDURE — 99223 1ST HOSP IP/OBS HIGH 75: CPT | Mod: GC

## 2021-01-01 PROCEDURE — 87641 MR-STAPH DNA AMP PROBE: CPT

## 2021-01-01 PROCEDURE — 93010 ELECTROCARDIOGRAM REPORT: CPT

## 2021-01-01 PROCEDURE — 99239 HOSP IP/OBS DSCHRG MGMT >30: CPT

## 2021-01-01 PROCEDURE — 97164 PT RE-EVAL EST PLAN CARE: CPT

## 2021-01-01 PROCEDURE — 94660 CPAP INITIATION&MGMT: CPT

## 2021-01-01 PROCEDURE — 93306 TTE W/DOPPLER COMPLETE: CPT | Mod: 26

## 2021-01-01 PROCEDURE — 71250 CT THORAX DX C-: CPT | Mod: MA

## 2021-01-01 PROCEDURE — U0005: CPT

## 2021-01-01 PROCEDURE — 84295 ASSAY OF SERUM SODIUM: CPT

## 2021-01-01 PROCEDURE — 12345: CPT | Mod: NC

## 2021-01-01 PROCEDURE — 84100 ASSAY OF PHOSPHORUS: CPT

## 2021-01-01 PROCEDURE — 94010 BREATHING CAPACITY TEST: CPT

## 2021-01-01 PROCEDURE — 93308 TTE F-UP OR LMTD: CPT | Mod: 26

## 2021-01-01 PROCEDURE — 82330 ASSAY OF CALCIUM: CPT

## 2021-01-01 PROCEDURE — 84132 ASSAY OF SERUM POTASSIUM: CPT

## 2021-01-01 PROCEDURE — 71250 CT THORAX DX C-: CPT | Mod: 26,MA

## 2021-01-01 PROCEDURE — 86850 RBC ANTIBODY SCREEN: CPT

## 2021-01-01 PROCEDURE — 73630 X-RAY EXAM OF FOOT: CPT

## 2021-01-01 PROCEDURE — 93971 EXTREMITY STUDY: CPT

## 2021-01-01 PROCEDURE — 71045 X-RAY EXAM CHEST 1 VIEW: CPT

## 2021-01-01 PROCEDURE — 85652 RBC SED RATE AUTOMATED: CPT

## 2021-01-01 PROCEDURE — 83880 ASSAY OF NATRIURETIC PEPTIDE: CPT

## 2021-01-01 PROCEDURE — 93970 EXTREMITY STUDY: CPT

## 2021-01-01 PROCEDURE — 93000 ELECTROCARDIOGRAM COMPLETE: CPT

## 2021-01-01 PROCEDURE — 83735 ASSAY OF MAGNESIUM: CPT

## 2021-01-01 PROCEDURE — 85027 COMPLETE CBC AUTOMATED: CPT

## 2021-01-01 PROCEDURE — 97116 GAIT TRAINING THERAPY: CPT

## 2021-01-01 PROCEDURE — 82947 ASSAY GLUCOSE BLOOD QUANT: CPT

## 2021-01-01 PROCEDURE — 86900 BLOOD TYPING SEROLOGIC ABO: CPT

## 2021-01-01 PROCEDURE — 82435 ASSAY OF BLOOD CHLORIDE: CPT

## 2021-01-01 PROCEDURE — C1751: CPT

## 2021-01-01 PROCEDURE — 36415 COLL VENOUS BLD VENIPUNCTURE: CPT

## 2021-01-01 PROCEDURE — 99291 CRITICAL CARE FIRST HOUR: CPT | Mod: CS,25,GC

## 2021-01-01 PROCEDURE — 76376 3D RENDER W/INTRP POSTPROCES: CPT

## 2021-01-01 RX ORDER — ATORVASTATIN CALCIUM 80 MG/1
1 TABLET, FILM COATED ORAL
Qty: 30 | Refills: 0
Start: 2021-01-01 | End: 2021-01-01

## 2021-01-01 RX ORDER — ATORVASTATIN CALCIUM 80 MG/1
1 TABLET, FILM COATED ORAL
Qty: 0 | Refills: 0 | DISCHARGE

## 2021-01-01 RX ORDER — SPIRONOLACTONE 25 MG/1
25 TABLET ORAL
Qty: 30 | Refills: 0 | Status: COMPLETED | COMMUNITY
Start: 2021-01-01 | End: 2021-01-01

## 2021-01-01 RX ORDER — FUROSEMIDE 40 MG
20 TABLET ORAL ONCE
Refills: 0 | Status: DISCONTINUED | OUTPATIENT
Start: 2021-01-01 | End: 2021-01-01

## 2021-01-01 RX ORDER — MAGNESIUM SULFATE 500 MG/ML
1 VIAL (ML) INJECTION ONCE
Refills: 0 | Status: COMPLETED | OUTPATIENT
Start: 2021-01-01 | End: 2021-01-01

## 2021-01-01 RX ORDER — ACETAMINOPHEN 500 MG
1000 TABLET ORAL ONCE
Refills: 0 | Status: COMPLETED | OUTPATIENT
Start: 2021-01-01 | End: 2021-01-01

## 2021-01-01 RX ORDER — ATORVASTATIN CALCIUM 80 MG/1
20 TABLET, FILM COATED ORAL AT BEDTIME
Refills: 0 | Status: DISCONTINUED | OUTPATIENT
Start: 2021-01-01 | End: 2021-01-01

## 2021-01-01 RX ORDER — CHOLECALCIFEROL (VITAMIN D3) 125 MCG
1 CAPSULE ORAL
Qty: 0 | Refills: 0 | DISCHARGE

## 2021-01-01 RX ORDER — LISINOPRIL 10 MG/1
10 TABLET ORAL
Qty: 30 | Refills: 5 | Status: DISCONTINUED | COMMUNITY
End: 2021-01-01

## 2021-01-01 RX ORDER — FERROUS SULFATE TAB EC 324 MG (65 MG FE EQUIVALENT) 324 (65 FE) MG
324 (65 FE) TABLET DELAYED RESPONSE ORAL
Qty: 90 | Refills: 1 | Status: ACTIVE | COMMUNITY
Start: 2021-01-01

## 2021-01-01 RX ORDER — THIAMINE MONONITRATE (VIT B1) 100 MG
100 TABLET ORAL
Refills: 0 | Status: DISCONTINUED | COMMUNITY
End: 2021-01-01

## 2021-01-01 RX ORDER — FUROSEMIDE 40 MG
40 TABLET ORAL DAILY
Refills: 0 | Status: DISCONTINUED | OUTPATIENT
Start: 2021-01-01 | End: 2021-01-01

## 2021-01-01 RX ORDER — OMEPRAZOLE 10 MG/1
1 CAPSULE, DELAYED RELEASE ORAL
Qty: 30 | Refills: 0
Start: 2021-01-01 | End: 2021-01-01

## 2021-01-01 RX ORDER — MAGNESIUM OXIDE 400 MG ORAL TABLET 241.3 MG
400 TABLET ORAL ONCE
Refills: 0 | Status: DISCONTINUED | OUTPATIENT
Start: 2021-01-01 | End: 2021-01-01

## 2021-01-01 RX ORDER — UBIDECARENONE/VIT E ACET 100MG-5
25 MCG CAPSULE ORAL
Refills: 0 | Status: DISCONTINUED | COMMUNITY
End: 2021-01-01

## 2021-01-01 RX ORDER — ATORVASTATIN CALCIUM 20 MG/1
20 TABLET, FILM COATED ORAL
Qty: 30 | Refills: 5 | Status: ACTIVE | COMMUNITY
Start: 2021-01-01 | End: 1900-01-01

## 2021-01-01 RX ORDER — INFLUENZA VIRUS VACCINE 15; 15; 15; 15 UG/.5ML; UG/.5ML; UG/.5ML; UG/.5ML
0.5 SUSPENSION INTRAMUSCULAR ONCE
Refills: 0 | Status: DISCONTINUED | OUTPATIENT
Start: 2021-01-01 | End: 2021-01-01

## 2021-01-01 RX ORDER — METOPROLOL SUCCINATE 25 MG/1
25 TABLET, EXTENDED RELEASE ORAL
Qty: 90 | Refills: 1 | Status: ACTIVE | COMMUNITY
Start: 2021-01-01 | End: 1900-01-01

## 2021-01-01 RX ORDER — CHLORHEXIDINE GLUCONATE 4 %
325 (65 FE) LIQUID (ML) TOPICAL DAILY
Refills: 0 | Status: DISCONTINUED | COMMUNITY
Start: 2021-01-01 | End: 2021-01-01

## 2021-01-01 RX ORDER — B-COMPLEX WITH VITAMIN C
100 TABLET ORAL
Refills: 0 | Status: DISCONTINUED | COMMUNITY
End: 2021-01-01

## 2021-01-01 RX ORDER — LISINOPRIL 2.5 MG/1
20 TABLET ORAL DAILY
Refills: 0 | Status: DISCONTINUED | OUTPATIENT
Start: 2021-01-01 | End: 2021-01-01

## 2021-01-01 RX ORDER — FERROUS SULFATE 325(65) MG
325 TABLET ORAL DAILY
Refills: 0 | Status: DISCONTINUED | OUTPATIENT
Start: 2021-01-01 | End: 2021-01-01

## 2021-01-01 RX ORDER — ALBUTEROL 90 UG/1
2 AEROSOL, METERED ORAL
Qty: 0 | Refills: 0 | DISCHARGE

## 2021-01-01 RX ORDER — PANTOPRAZOLE SODIUM 20 MG/1
1 TABLET, DELAYED RELEASE ORAL
Qty: 0 | Refills: 0 | DISCHARGE
Start: 2021-01-01

## 2021-01-01 RX ORDER — FUROSEMIDE 80 MG/1
80 TABLET ORAL DAILY
Refills: 0 | Status: DISCONTINUED | COMMUNITY
Start: 2021-01-01 | End: 2021-01-01

## 2021-01-01 RX ORDER — WHEELCHAIR
EACH MISCELLANEOUS
Qty: 1 | Refills: 0 | Status: ACTIVE | COMMUNITY
Start: 2021-01-01

## 2021-01-01 RX ORDER — HYDROCHLOROTHIAZIDE 12.5 MG/1
12.5 TABLET ORAL
Qty: 90 | Refills: 2 | Status: DISCONTINUED | COMMUNITY
End: 2021-01-01

## 2021-01-01 RX ORDER — METOPROLOL TARTRATE 50 MG
25 TABLET ORAL DAILY
Refills: 0 | Status: DISCONTINUED | OUTPATIENT
Start: 2021-01-01 | End: 2021-01-01

## 2021-01-01 RX ORDER — POTASSIUM CHLORIDE 1500 MG/1
20 TABLET, FILM COATED, EXTENDED RELEASE ORAL
Qty: 14 | Refills: 0 | Status: DISCONTINUED | COMMUNITY
Start: 2021-01-01 | End: 2021-01-01

## 2021-01-01 RX ORDER — FERROUS SULFATE 325(65) MG
325 TABLET ORAL ONCE
Refills: 0 | Status: COMPLETED | OUTPATIENT
Start: 2021-01-01 | End: 2021-01-01

## 2021-01-01 RX ORDER — MAGNESIUM OXIDE 241.3 MG/1000MG
400 TABLET ORAL TWICE DAILY
Refills: 0 | Status: ACTIVE | COMMUNITY
Start: 2021-01-01

## 2021-01-01 RX ORDER — ACETAMINOPHEN 500 MG
650 TABLET ORAL EVERY 6 HOURS
Refills: 0 | Status: DISCONTINUED | OUTPATIENT
Start: 2021-01-01 | End: 2021-01-01

## 2021-01-01 RX ORDER — PANTOPRAZOLE SODIUM 20 MG/1
40 TABLET, DELAYED RELEASE ORAL
Refills: 0 | Status: DISCONTINUED | OUTPATIENT
Start: 2021-01-01 | End: 2021-01-01

## 2021-01-01 RX ORDER — COLCHICINE 0.6 MG
0.6 TABLET ORAL ONCE
Refills: 0 | Status: COMPLETED | OUTPATIENT
Start: 2021-01-01 | End: 2021-01-01

## 2021-01-01 RX ORDER — FUROSEMIDE 80 MG/1
80 TABLET ORAL
Qty: 90 | Refills: 1 | Status: ACTIVE | COMMUNITY
Start: 2021-01-01

## 2021-01-01 RX ORDER — MAGNESIUM OXIDE 400 MG ORAL TABLET 241.3 MG
1 TABLET ORAL
Qty: 0 | Refills: 0 | DISCHARGE
Start: 2021-01-01

## 2021-01-01 RX ORDER — OMEPRAZOLE 40 MG/1
40 CAPSULE, DELAYED RELEASE ORAL
Qty: 90 | Refills: 3 | Status: ACTIVE | COMMUNITY
Start: 2021-01-01

## 2021-01-01 RX ORDER — FUROSEMIDE 40 MG
40 TABLET ORAL ONCE
Refills: 0 | Status: COMPLETED | OUTPATIENT
Start: 2021-01-01 | End: 2021-01-01

## 2021-01-01 RX ORDER — OMEPRAZOLE 10 MG/1
1 CAPSULE, DELAYED RELEASE ORAL
Qty: 0 | Refills: 0 | DISCHARGE

## 2021-01-01 RX ORDER — ALBUTEROL SULFATE 90 UG/1
108 (90 BASE) AEROSOL, METERED RESPIRATORY (INHALATION)
Refills: 0 | Status: ACTIVE | COMMUNITY
Start: 2021-01-01

## 2021-01-01 RX ORDER — FUROSEMIDE 40 MG
40 TABLET ORAL
Refills: 0 | Status: DISCONTINUED | OUTPATIENT
Start: 2021-01-01 | End: 2021-01-01

## 2021-01-01 RX ORDER — POTASSIUM CHLORIDE 1500 MG/1
20 TABLET, FILM COATED, EXTENDED RELEASE ORAL
Qty: 30 | Refills: 0 | Status: ACTIVE | COMMUNITY
Start: 2021-01-01

## 2021-01-01 RX ORDER — SODIUM CHLORIDE 9 MG/ML
500 INJECTION INTRAMUSCULAR; INTRAVENOUS; SUBCUTANEOUS
Refills: 0 | Status: COMPLETED | OUTPATIENT
Start: 2021-01-01 | End: 2021-01-01

## 2021-01-01 RX ORDER — ENOXAPARIN SODIUM 100 MG/ML
40 INJECTION SUBCUTANEOUS DAILY
Refills: 0 | Status: DISCONTINUED | OUTPATIENT
Start: 2021-01-01 | End: 2021-01-01

## 2021-01-01 RX ORDER — FUROSEMIDE 40 MG
1 TABLET ORAL
Qty: 0 | Refills: 0 | DISCHARGE

## 2021-01-01 RX ORDER — METOPROLOL TARTRATE 50 MG
1 TABLET ORAL
Qty: 0 | Refills: 0 | DISCHARGE
Start: 2021-01-01

## 2021-01-01 RX ORDER — METOPROLOL TARTRATE 50 MG
1 TABLET ORAL
Qty: 30 | Refills: 0
Start: 2021-01-01 | End: 2021-01-01

## 2021-01-01 RX ORDER — FAMOTIDINE 10 MG/ML
20 INJECTION INTRAVENOUS DAILY
Refills: 0 | Status: DISCONTINUED | OUTPATIENT
Start: 2021-01-01 | End: 2021-01-01

## 2021-01-01 RX ORDER — LISINOPRIL 20 MG/1
20 TABLET ORAL
Qty: 90 | Refills: 3 | Status: DISCONTINUED | COMMUNITY
Start: 2021-01-01 | End: 2021-01-01

## 2021-01-01 RX ORDER — MAGNESIUM OXIDE 400 MG ORAL TABLET 241.3 MG
400 TABLET ORAL
Refills: 0 | Status: DISCONTINUED | OUTPATIENT
Start: 2021-01-01 | End: 2021-01-01

## 2021-01-01 RX ORDER — UBIDECARENONE/VIT E ACET 100MG-5
100 CAPSULE ORAL
Refills: 0 | Status: DISCONTINUED | COMMUNITY
Start: 2021-01-01 | End: 2021-01-01

## 2021-01-01 RX ORDER — FUROSEMIDE 40 MG
80 TABLET ORAL DAILY
Refills: 0 | Status: DISCONTINUED | OUTPATIENT
Start: 2021-01-01 | End: 2021-01-01

## 2021-01-01 RX ORDER — ATORVASTATIN CALCIUM 80 MG/1
1 TABLET, FILM COATED ORAL
Qty: 0 | Refills: 0 | DISCHARGE
Start: 2021-01-01

## 2021-01-01 RX ORDER — FUROSEMIDE 40 MG
1 TABLET ORAL
Qty: 0 | Refills: 0 | DISCHARGE
Start: 2021-01-01

## 2021-01-01 RX ORDER — FERROUS SULFATE 325(65) MG
1 TABLET ORAL
Qty: 0 | Refills: 0 | DISCHARGE

## 2021-01-01 RX ORDER — ALBUTEROL 90 UG/1
2 AEROSOL, METERED ORAL
Qty: 1 | Refills: 0
Start: 2021-01-01

## 2021-01-01 RX ADMIN — Medication 650 MILLIGRAM(S): at 12:00

## 2021-01-01 RX ADMIN — MAGNESIUM OXIDE 400 MG ORAL TABLET 400 MILLIGRAM(S): 241.3 TABLET ORAL at 17:32

## 2021-01-01 RX ADMIN — LISINOPRIL 20 MILLIGRAM(S): 2.5 TABLET ORAL at 05:11

## 2021-01-01 RX ADMIN — FAMOTIDINE 20 MILLIGRAM(S): 10 INJECTION INTRAVENOUS at 11:39

## 2021-01-01 RX ADMIN — MAGNESIUM OXIDE 400 MG ORAL TABLET 400 MILLIGRAM(S): 241.3 TABLET ORAL at 17:52

## 2021-01-01 RX ADMIN — Medication 40 MILLIGRAM(S): at 05:16

## 2021-01-01 RX ADMIN — ATORVASTATIN CALCIUM 20 MILLIGRAM(S): 80 TABLET, FILM COATED ORAL at 21:10

## 2021-01-01 RX ADMIN — ATORVASTATIN CALCIUM 20 MILLIGRAM(S): 80 TABLET, FILM COATED ORAL at 21:24

## 2021-01-01 RX ADMIN — Medication 650 MILLIGRAM(S): at 21:24

## 2021-01-01 RX ADMIN — Medication 80 MILLIGRAM(S): at 05:29

## 2021-01-01 RX ADMIN — Medication 650 MILLIGRAM(S): at 23:51

## 2021-01-01 RX ADMIN — Medication 40 MILLIGRAM(S): at 19:09

## 2021-01-01 RX ADMIN — Medication 650 MILLIGRAM(S): at 10:00

## 2021-01-01 RX ADMIN — PANTOPRAZOLE SODIUM 40 MILLIGRAM(S): 20 TABLET, DELAYED RELEASE ORAL at 05:14

## 2021-01-01 RX ADMIN — ATORVASTATIN CALCIUM 20 MILLIGRAM(S): 80 TABLET, FILM COATED ORAL at 22:19

## 2021-01-01 RX ADMIN — ENOXAPARIN SODIUM 40 MILLIGRAM(S): 100 INJECTION SUBCUTANEOUS at 13:31

## 2021-01-01 RX ADMIN — MAGNESIUM OXIDE 400 MG ORAL TABLET 400 MILLIGRAM(S): 241.3 TABLET ORAL at 17:17

## 2021-01-01 RX ADMIN — MAGNESIUM OXIDE 400 MG ORAL TABLET 400 MILLIGRAM(S): 241.3 TABLET ORAL at 09:41

## 2021-01-01 RX ADMIN — MAGNESIUM OXIDE 400 MG ORAL TABLET 400 MILLIGRAM(S): 241.3 TABLET ORAL at 17:18

## 2021-01-01 RX ADMIN — Medication 1000 MILLIGRAM(S): at 23:22

## 2021-01-01 RX ADMIN — ENOXAPARIN SODIUM 40 MILLIGRAM(S): 100 INJECTION SUBCUTANEOUS at 12:01

## 2021-01-01 RX ADMIN — Medication 650 MILLIGRAM(S): at 05:52

## 2021-01-01 RX ADMIN — ENOXAPARIN SODIUM 40 MILLIGRAM(S): 100 INJECTION SUBCUTANEOUS at 11:45

## 2021-01-01 RX ADMIN — ATORVASTATIN CALCIUM 20 MILLIGRAM(S): 80 TABLET, FILM COATED ORAL at 21:05

## 2021-01-01 RX ADMIN — Medication 325 MILLIGRAM(S): at 11:49

## 2021-01-01 RX ADMIN — Medication 40 MILLIGRAM(S): at 17:30

## 2021-01-01 RX ADMIN — ATORVASTATIN CALCIUM 20 MILLIGRAM(S): 80 TABLET, FILM COATED ORAL at 21:00

## 2021-01-01 RX ADMIN — Medication 400 MILLIGRAM(S): at 01:27

## 2021-01-01 RX ADMIN — ATORVASTATIN CALCIUM 20 MILLIGRAM(S): 80 TABLET, FILM COATED ORAL at 21:34

## 2021-01-01 RX ADMIN — Medication 325 MILLIGRAM(S): at 11:39

## 2021-01-01 RX ADMIN — Medication 650 MILLIGRAM(S): at 06:14

## 2021-01-01 RX ADMIN — Medication 650 MILLIGRAM(S): at 21:00

## 2021-01-01 RX ADMIN — MAGNESIUM OXIDE 400 MG ORAL TABLET 400 MILLIGRAM(S): 241.3 TABLET ORAL at 12:08

## 2021-01-01 RX ADMIN — Medication 0.6 MILLIGRAM(S): at 08:09

## 2021-01-01 RX ADMIN — ENOXAPARIN SODIUM 40 MILLIGRAM(S): 100 INJECTION SUBCUTANEOUS at 12:38

## 2021-01-01 RX ADMIN — MAGNESIUM OXIDE 400 MG ORAL TABLET 400 MILLIGRAM(S): 241.3 TABLET ORAL at 07:24

## 2021-01-01 RX ADMIN — Medication 325 MILLIGRAM(S): at 05:53

## 2021-01-01 RX ADMIN — ENOXAPARIN SODIUM 40 MILLIGRAM(S): 100 INJECTION SUBCUTANEOUS at 10:36

## 2021-01-01 RX ADMIN — Medication 325 MILLIGRAM(S): at 10:37

## 2021-01-01 RX ADMIN — ATORVASTATIN CALCIUM 20 MILLIGRAM(S): 80 TABLET, FILM COATED ORAL at 22:47

## 2021-01-01 RX ADMIN — Medication 80 MILLIGRAM(S): at 16:03

## 2021-01-01 RX ADMIN — Medication 650 MILLIGRAM(S): at 23:00

## 2021-01-01 RX ADMIN — MAGNESIUM OXIDE 400 MG ORAL TABLET 400 MILLIGRAM(S): 241.3 TABLET ORAL at 08:38

## 2021-01-01 RX ADMIN — Medication 325 MILLIGRAM(S): at 05:29

## 2021-01-01 RX ADMIN — Medication 325 MILLIGRAM(S): at 17:02

## 2021-01-01 RX ADMIN — Medication 40 MILLIGRAM(S): at 05:17

## 2021-01-01 RX ADMIN — ENOXAPARIN SODIUM 40 MILLIGRAM(S): 100 INJECTION SUBCUTANEOUS at 11:31

## 2021-01-01 RX ADMIN — Medication 325 MILLIGRAM(S): at 11:58

## 2021-01-01 RX ADMIN — MAGNESIUM OXIDE 400 MG ORAL TABLET 400 MILLIGRAM(S): 241.3 TABLET ORAL at 17:49

## 2021-01-01 RX ADMIN — MAGNESIUM OXIDE 400 MG ORAL TABLET 400 MILLIGRAM(S): 241.3 TABLET ORAL at 18:16

## 2021-01-01 RX ADMIN — Medication 40 MILLIGRAM(S): at 17:52

## 2021-01-01 RX ADMIN — Medication 0.6 MILLIGRAM(S): at 15:28

## 2021-01-01 RX ADMIN — MAGNESIUM OXIDE 400 MG ORAL TABLET 400 MILLIGRAM(S): 241.3 TABLET ORAL at 18:04

## 2021-01-01 RX ADMIN — ENOXAPARIN SODIUM 40 MILLIGRAM(S): 100 INJECTION SUBCUTANEOUS at 12:31

## 2021-01-01 RX ADMIN — Medication 650 MILLIGRAM(S): at 10:10

## 2021-01-01 RX ADMIN — MAGNESIUM OXIDE 400 MG ORAL TABLET 400 MILLIGRAM(S): 241.3 TABLET ORAL at 08:41

## 2021-01-01 RX ADMIN — Medication 40 MILLIGRAM(S): at 15:49

## 2021-01-01 RX ADMIN — SODIUM CHLORIDE 30 MILLILITER(S): 9 INJECTION INTRAMUSCULAR; INTRAVENOUS; SUBCUTANEOUS at 08:36

## 2021-01-01 RX ADMIN — Medication 400 MILLIGRAM(S): at 20:59

## 2021-01-01 RX ADMIN — ENOXAPARIN SODIUM 40 MILLIGRAM(S): 100 INJECTION SUBCUTANEOUS at 13:09

## 2021-01-01 RX ADMIN — Medication 100 GRAM(S): at 22:18

## 2021-01-01 RX ADMIN — MAGNESIUM OXIDE 400 MG ORAL TABLET 400 MILLIGRAM(S): 241.3 TABLET ORAL at 09:42

## 2021-01-01 RX ADMIN — Medication 325 MILLIGRAM(S): at 05:30

## 2021-01-01 RX ADMIN — Medication 25 MILLIGRAM(S): at 16:04

## 2021-01-01 RX ADMIN — Medication 650 MILLIGRAM(S): at 07:01

## 2021-01-01 RX ADMIN — PANTOPRAZOLE SODIUM 40 MILLIGRAM(S): 20 TABLET, DELAYED RELEASE ORAL at 06:36

## 2021-01-01 RX ADMIN — MAGNESIUM OXIDE 400 MG ORAL TABLET 400 MILLIGRAM(S): 241.3 TABLET ORAL at 08:29

## 2021-01-01 RX ADMIN — Medication 325 MILLIGRAM(S): at 08:26

## 2021-01-01 RX ADMIN — MAGNESIUM OXIDE 400 MG ORAL TABLET 400 MILLIGRAM(S): 241.3 TABLET ORAL at 08:47

## 2021-01-01 RX ADMIN — MAGNESIUM OXIDE 400 MG ORAL TABLET 400 MILLIGRAM(S): 241.3 TABLET ORAL at 17:54

## 2021-01-01 RX ADMIN — Medication 40 MILLIGRAM(S): at 17:37

## 2021-01-01 RX ADMIN — ATORVASTATIN CALCIUM 20 MILLIGRAM(S): 80 TABLET, FILM COATED ORAL at 22:14

## 2021-01-01 RX ADMIN — Medication 1000 MILLIGRAM(S): at 21:41

## 2021-01-01 RX ADMIN — MAGNESIUM OXIDE 400 MG ORAL TABLET 400 MILLIGRAM(S): 241.3 TABLET ORAL at 19:49

## 2021-01-01 RX ADMIN — Medication 40 MILLIGRAM(S): at 05:44

## 2021-01-01 RX ADMIN — ATORVASTATIN CALCIUM 20 MILLIGRAM(S): 80 TABLET, FILM COATED ORAL at 21:25

## 2021-01-01 RX ADMIN — MAGNESIUM OXIDE 400 MG ORAL TABLET 400 MILLIGRAM(S): 241.3 TABLET ORAL at 08:25

## 2021-01-01 RX ADMIN — MAGNESIUM OXIDE 400 MG ORAL TABLET 400 MILLIGRAM(S): 241.3 TABLET ORAL at 17:31

## 2021-01-01 RX ADMIN — Medication 25 MILLIGRAM(S): at 05:29

## 2021-01-01 RX ADMIN — Medication 1000 MILLIGRAM(S): at 07:20

## 2021-01-01 RX ADMIN — PANTOPRAZOLE SODIUM 40 MILLIGRAM(S): 20 TABLET, DELAYED RELEASE ORAL at 06:41

## 2021-01-01 RX ADMIN — PANTOPRAZOLE SODIUM 40 MILLIGRAM(S): 20 TABLET, DELAYED RELEASE ORAL at 06:06

## 2021-01-01 RX ADMIN — ENOXAPARIN SODIUM 40 MILLIGRAM(S): 100 INJECTION SUBCUTANEOUS at 11:49

## 2021-01-01 RX ADMIN — Medication 650 MILLIGRAM(S): at 07:20

## 2021-01-01 RX ADMIN — Medication 650 MILLIGRAM(S): at 13:00

## 2021-01-01 RX ADMIN — Medication 1000 MILLIGRAM(S): at 02:57

## 2021-01-01 RX ADMIN — Medication 40 MILLIGRAM(S): at 21:10

## 2021-01-01 RX ADMIN — ENOXAPARIN SODIUM 40 MILLIGRAM(S): 100 INJECTION SUBCUTANEOUS at 12:08

## 2021-01-01 RX ADMIN — Medication 40 MILLIGRAM(S): at 08:38

## 2021-01-01 RX ADMIN — Medication 650 MILLIGRAM(S): at 23:22

## 2021-01-01 RX ADMIN — PANTOPRAZOLE SODIUM 40 MILLIGRAM(S): 20 TABLET, DELAYED RELEASE ORAL at 05:20

## 2021-01-01 RX ADMIN — Medication 325 MILLIGRAM(S): at 12:08

## 2021-01-01 RX ADMIN — PANTOPRAZOLE SODIUM 40 MILLIGRAM(S): 20 TABLET, DELAYED RELEASE ORAL at 08:38

## 2021-01-01 RX ADMIN — Medication 400 MILLIGRAM(S): at 21:26

## 2021-01-01 RX ADMIN — Medication 400 MILLIGRAM(S): at 05:20

## 2021-01-01 RX ADMIN — Medication 20 MILLIGRAM(S): at 05:17

## 2021-01-01 RX ADMIN — MAGNESIUM OXIDE 400 MG ORAL TABLET 400 MILLIGRAM(S): 241.3 TABLET ORAL at 08:49

## 2021-01-01 RX ADMIN — ATORVASTATIN CALCIUM 20 MILLIGRAM(S): 80 TABLET, FILM COATED ORAL at 21:47

## 2021-01-01 RX ADMIN — Medication 650 MILLIGRAM(S): at 22:24

## 2021-01-01 RX ADMIN — Medication 325 MILLIGRAM(S): at 05:10

## 2021-01-01 RX ADMIN — PANTOPRAZOLE SODIUM 40 MILLIGRAM(S): 20 TABLET, DELAYED RELEASE ORAL at 05:10

## 2021-01-01 RX ADMIN — Medication 100 GRAM(S): at 21:09

## 2021-01-01 RX ADMIN — Medication 650 MILLIGRAM(S): at 22:39

## 2021-01-01 RX ADMIN — LISINOPRIL 20 MILLIGRAM(S): 2.5 TABLET ORAL at 05:17

## 2021-01-01 RX ADMIN — Medication 40 MILLIGRAM(S): at 18:16

## 2021-01-01 RX ADMIN — ENOXAPARIN SODIUM 40 MILLIGRAM(S): 100 INJECTION SUBCUTANEOUS at 11:48

## 2021-01-01 RX ADMIN — MAGNESIUM OXIDE 400 MG ORAL TABLET 400 MILLIGRAM(S): 241.3 TABLET ORAL at 09:33

## 2021-01-01 RX ADMIN — Medication 40 MILLIGRAM(S): at 05:30

## 2021-01-01 RX ADMIN — Medication 650 MILLIGRAM(S): at 08:16

## 2021-01-01 RX ADMIN — ATORVASTATIN CALCIUM 20 MILLIGRAM(S): 80 TABLET, FILM COATED ORAL at 21:14

## 2021-01-01 RX ADMIN — Medication 40 MILLIGRAM(S): at 06:06

## 2021-01-01 RX ADMIN — MAGNESIUM OXIDE 400 MG ORAL TABLET 400 MILLIGRAM(S): 241.3 TABLET ORAL at 17:41

## 2021-01-01 RX ADMIN — Medication 40 MILLIGRAM(S): at 05:28

## 2021-01-01 RX ADMIN — Medication 40 MILLIGRAM(S): at 05:11

## 2021-01-01 RX ADMIN — ENOXAPARIN SODIUM 40 MILLIGRAM(S): 100 INJECTION SUBCUTANEOUS at 11:58

## 2021-01-01 RX ADMIN — MAGNESIUM OXIDE 400 MG ORAL TABLET 400 MILLIGRAM(S): 241.3 TABLET ORAL at 18:23

## 2021-01-01 RX ADMIN — ATORVASTATIN CALCIUM 20 MILLIGRAM(S): 80 TABLET, FILM COATED ORAL at 23:51

## 2021-01-01 RX ADMIN — Medication 325 MILLIGRAM(S): at 12:31

## 2021-01-01 RX ADMIN — Medication 650 MILLIGRAM(S): at 05:43

## 2021-01-01 RX ADMIN — PANTOPRAZOLE SODIUM 40 MILLIGRAM(S): 20 TABLET, DELAYED RELEASE ORAL at 05:29

## 2021-01-01 RX ADMIN — MAGNESIUM OXIDE 400 MG ORAL TABLET 400 MILLIGRAM(S): 241.3 TABLET ORAL at 18:15

## 2021-01-01 RX ADMIN — Medication 650 MILLIGRAM(S): at 17:18

## 2021-01-01 RX ADMIN — PANTOPRAZOLE SODIUM 40 MILLIGRAM(S): 20 TABLET, DELAYED RELEASE ORAL at 05:53

## 2021-01-01 RX ADMIN — Medication 400 MILLIGRAM(S): at 11:57

## 2021-01-01 RX ADMIN — Medication 40 MILLIGRAM(S): at 05:10

## 2021-01-01 RX ADMIN — Medication 40 MILLIGRAM(S): at 06:41

## 2021-01-01 RX ADMIN — Medication 650 MILLIGRAM(S): at 11:07

## 2021-01-01 RX ADMIN — MAGNESIUM OXIDE 400 MG ORAL TABLET 400 MILLIGRAM(S): 241.3 TABLET ORAL at 09:31

## 2021-01-01 RX ADMIN — Medication 325 MILLIGRAM(S): at 05:22

## 2021-01-01 RX ADMIN — Medication 1000 MILLIGRAM(S): at 12:00

## 2021-01-01 RX ADMIN — Medication 325 MILLIGRAM(S): at 05:15

## 2021-01-01 RX ADMIN — ENOXAPARIN SODIUM 40 MILLIGRAM(S): 100 INJECTION SUBCUTANEOUS at 11:39

## 2021-01-01 RX ADMIN — Medication 100 GRAM(S): at 11:56

## 2021-01-01 RX ADMIN — Medication 40 MILLIGRAM(S): at 05:52

## 2021-01-01 RX ADMIN — MAGNESIUM OXIDE 400 MG ORAL TABLET 400 MILLIGRAM(S): 241.3 TABLET ORAL at 08:58

## 2021-01-01 RX ADMIN — PANTOPRAZOLE SODIUM 40 MILLIGRAM(S): 20 TABLET, DELAYED RELEASE ORAL at 05:22

## 2021-07-28 PROBLEM — Z87.19 HISTORY OF HIATAL HERNIA: Status: RESOLVED | Noted: 2021-01-01 | Resolved: 2021-01-01

## 2021-08-02 PROBLEM — Z78.9 SOCIAL ALCOHOL USE: Status: ACTIVE | Noted: 2021-01-01

## 2021-08-02 PROBLEM — Z78.9 NON-SMOKER: Status: ACTIVE | Noted: 2021-01-01

## 2021-08-04 NOTE — PHYSICAL EXAM
[General Appearance - Alert] : alert [General Appearance - In No Acute Distress] : in no acute distress [Sclera] : the sclera and conjunctiva were normal [Neck Appearance] : the appearance of the neck was normal [] : no respiratory distress [Respiration, Rhythm And Depth] : normal respiratory rhythm and effort [Exaggerated Use Of Accessory Muscles For Inspiration] : no accessory muscle use [Auscultation Breath Sounds / Voice Sounds] : lungs were clear to auscultation bilaterally [Apical Impulse] : the apical impulse was normal [Heart Rate And Rhythm] : heart rate was normal and rhythm regular [Heart Sounds] : normal S1 and S2 [Bowel Sounds] : normal bowel sounds [Abdomen Soft] : soft [Abdomen Tenderness] : non-tender [No Focal Deficits] : no focal deficits [Oriented To Time, Place, And Person] : oriented to person, place, and time [Impaired Insight] : insight and judgment were intact [Affect] : the affect was normal [Mood] : the mood was normal [Right Carotid Bruit] : no bruit heard over the right carotid [Left Carotid Bruit] : no bruit heard over the left carotid [Involuntary Movements] : no involuntary movements were seen [FreeTextEntry1] : Acanthosis to neck

## 2021-08-04 NOTE — ASSESSMENT
[FreeTextEntry1] : 71F Jehovah Witness with h/o GIB c/o SOB with moderate MS/AS.  Pt had JONE done at [facility], will need to evaluate the images as based on the TTE, the degree of MS/AS does not warrant intervention at this time.  Should the JONE reveal severe MS/AS, pt will need to be evaluated for open AVR/MVR versus TAVR/balloon valvuoplasty of the mitral valve.  Pt was also informed that should she need valvular intervention, she will need to have her GIB evaluated and intervened upon prior to proceeding.\par \par Plan:\par - pt's daughter to obtain JONE disk from [facility]\par

## 2021-08-04 NOTE — END OF VISIT
[FreeTextEntry3] : Written by Mario Centeno NP, acting as a scribe for Dr. Price\par “The documentation recorded by the scribe accurately reflects the service I personally performed and the decisions made by me.” Signature Akshat Price MD

## 2021-08-04 NOTE — DATA REVIEWED
[FreeTextEntry1] : TTE (5/10/21, [facility]): Moderate-Sev AS, PVG 25 mmHg, Mean 15 mmHg, JAYLEN 1.23cm2, mitral valve annulus is calcified, leaflets moderately thickened and calcified at the tip, consistent with mod MS, trace MR, restricted mitral systolic and diastolic leaflet motion, consistent with rheumatic valvular disease.

## 2021-08-04 NOTE — HISTORY OF PRESENT ILLNESS
[Heart Failure within 2 Weeks] : Heart Failure in last 2 weeks [Prior Heart Failure] : Prior Heart Failure [FreeTextEntry1] : Hansa is a 71 year old female who presents for evaluation of moderate rheumatic MS and mod-severe aortic stenosis. PMH includes HTN, HLD, Anemia, SENTHIL, KATE, Hx of GI bleeds, sp endoscopic cauterization X 3 (last December 2020). She was followed by cardiologist Dr. Ray and was admitted to [facility] in March of this year for CHF.  She was diuresised and DC home where she followed up with Dr. Zamudio and Dr. Lance Singer at [facility] in May. She underwent TTE and CT at [facility] in May and was told she needed surgery on her valve. She is a Jehovah Witness and declined surgery at that time as she was told they were unable to do bloodless surgery at [facility].  She reports she discussed this within her community where Dr. Lynn was referred by one of her friends. \par \par She presents today with her daughter and reports she was initially told she needed surgery on her heart back in March at [facility] however she reports the cardiologist later told her she did not need surgery. She reports she had a cardiac cath in March at [facility] however is unsure of the results and does not have report. \par \par She reports increasing SOB and is now only able to walk about 10-15 feet at which point she experiences SOB and knee pain and has to stop as a resu;t of both. She reports 6 months ago she was walking much more without difficulty. She reports that at these times she uses her inhaler with improvement in her SOB immediately. Reports some chest discomfort to left side over the past week that she describes as "aching pain" that she reports is associated with positioning and is not associated with any symptoms.  Sleeps with one pillow and has hx of SENTHIL. Reports BL LE swelling that has gotten better with Furosemide 40mg daily (occasionally take an extra 20mg approximately 1 every 1-2 weeks). Denies weight gain, dizziness, palpitations, cough, fever or chills. She remembers having been told she had scarlet/rheumatic fever at age 10. With respect to her GI bleeds she reports her "blood level" was as low as 5.2 in the past where she underwent iron infusions and Epogen due to contraindication of blood transfusions. She reports she underwent endoscopic gastic cauterization in Dec 2020 and report level is now around 12 but reports she still has "bleeding to her stomach lining".   Follows Dr. Ray for primary cardiology. \par \par Denies DM, liver or kidney disease. Denies prior CVA or MI. [Diabetes Mellitus] : no Diabetes Melllitus [Dyslipidemia] : no dyslipidemia [Home Oxygen] : no home oxygen use [Liver Disease] : no liver disease [Unresponsive Neurologic State] : not in a unresponsive neurologic state [Cerebrovascular Disease] : no cerebrovascular disease [Prior Myocardial Infarction] : No prior myocardial infarction [V tach / V fib] :  but no V tach/V fib [A fib / A flutter] : no A fib or A flutter aching

## 2021-08-04 NOTE — REVIEW OF SYSTEMS
[Lower Ext Edema] : lower extremity edema [SOB on Exertion] : shortness of breath during exertion [As Noted in HPI] : as noted in HPI [Joint Pain] : joint pain [Negative] : Heme/Lymph [Palpitations] : no palpitations [FreeTextEntry7] : Hx of GI bleeds [FreeTextEntry9] : BL knee pain

## 2021-09-30 NOTE — ED ADULT NURSE REASSESSMENT NOTE - NS ED NURSE REASSESS COMMENT FT1
Received report from CHERIE Martin. Pt is awake and alert, resting comfortably in stretcher, speaking in full coherent sentences. Pt denies CP, SOB, fevers, chills, N/V/D, HA, vision changes. Call bell within reach, comfort & safety provided.

## 2021-09-30 NOTE — ED ADULT NURSE NOTE - OBJECTIVE STATEMENT
71F, AAO3, c/o worsening SOB x2 weeks. Pt sts "I was told to go to the ED by my doctor because of my CHF. I can't breath when I move". Does not use home O2. Pt noted to have SPO2 mid 70's, placed on NC 6lpm SPO2 improved to mid 90's. Speaking clear in full sentences. +4 edema all extremities. Abdomen soft, non-tender. Skin intact, appropriate for age and race. Denies back pain, denies chest pain.

## 2021-09-30 NOTE — ED ADULT TRIAGE NOTE - TEMPERATURE IN CELSIUS (DEGREES C)
37.5 Opioid Counseling: I discussed with the patient the potential side effects of opioids including but not limited to addiction, altered mental status, and depression. I stressed avoiding alcohol, benzodiazepines, muscle relaxants and sleep aids unless specifically okayed by a physician. The patient verbalized understanding of the proper use and possible adverse effects of opioids. All of the patient's questions and concerns were addressed. They were instructed to flush the remaining pills down the toilet if they did not need them for pain.

## 2021-09-30 NOTE — ED PROVIDER NOTE - ATTENDING CONTRIBUTION TO CARE
Attending MD Mago Jaimes:  I personally have seen and examined this patient.  Resident note reviewed and agree on plan of care and except where noted.  See HPI, PE, and MDM for details.

## 2021-09-30 NOTE — ED PROVIDER NOTE - OBJECTIVE STATEMENT
71F hx CHF ...... sent in by PMD for concern for CHF exacerbation. Pt w/ 71F hx CHF ...... sent in by PMD for concern for CHF exacerbation. Pt w/    Attending Mago Jaimes: 70 yo female with multiple medical issues including h/o CHF, HTN, mitral and aortic valve disease followed by Dr Price presenting with worsening sob. pt has been getting progressively more sob. no fevesr or chlls. dyspnea with minimal exertion. no fevers or chills. no black or  bloody stools. has chronic LE swelling left worse thenright. no cough. no pain with breathing. did have a recent JONE to further evaluate the valve josue: 71F hx CHF, HTN, mitral/aortic valve dx sent in by PMD for concern for CHF exacerbation. Pt w/ worsening constant SOB w/o fever or chills. Dyspneic with mild exertion, no dark/bloody stools. Chronic LE swelling L > R.     Attending Mago Jaimes: 70 yo female with multiple medical issues including h/o CHF, HTN, mitral and aortic valve disease followed by Dr Price presenting with worsening sob. pt has been getting progressively more sob. no fevesr or chlls. dyspnea with minimal exertion. no fevers or chills. no black or  bloody stools. has chronic LE swelling left worse thenright. no cough. no pain with breathing. did have a recent JONE to further evaluate the valve

## 2021-09-30 NOTE — ED PROVIDER NOTE - NS ED ROS FT
CONST: no fevers, no chills, no lightheadedness  HEENT: no vision change, no sore throat  CV: no chest pain, no palpitations  RESP: no cough, + shortness of breath  ABD: no abdominal pain, no nausea/vomiting, no diarrhea  : no dysuria, no hematuria  ENDO: no frequent urination, no unusual thirst  MSK: no musculoskeletal pain + LE swelling   NEURO: no headache, no focal weakness, no loss of sensation  SKIN:  no rash

## 2021-09-30 NOTE — ED PROVIDER NOTE - CLINICAL SUMMARY MEDICAL DECISION MAKING FREE TEXT BOX
71F sent in for SOB - bedside pocus cardiac w/ concern for R Heart hypertrophy - possibly in setting of progressive pulmonary HTN. Will DVT study LE r/o clot in setting of potential PE,  given persistent hypoxia w/ exertion anticipate bipap, xray w/ possible CT chest, anticipate admission. 71F sent in for SOB - bedside pocus cardiac w/ concern for R Heart hypertrophy - possibly in setting of progressive pulmonary HTN. Will DVT study LE r/o clot in setting of potential PE,  given persistent hypoxia w/ exertion anticipate bipap, xray w/ possible CT chest, anticipate admission.  Attending Mago Jaimes: 72 yo female with h/o valvular disease presenting with sob. upon arrival pt dypsnic and hypoxic with minimal exertion. no fevers or chills. pocus performed showing a line predominant lung fields and RV enlargement. no evidence of DVT on labs. pt with anaphylaxis to contrast. age adjusted d dimer wnl making PE less likely. concern for cardiorenal and RV failure. no known ho pulmonary htn. will give small dose of diuretic. labs and cardiac work up performed. pt will need admission. cardiology evaluation. ct surgery also consulted as pt follows with Dr Price for possible valve repair

## 2021-09-30 NOTE — ED ADULT NURSE REASSESSMENT NOTE - NS ED NURSE REASSESS COMMENT FT1
Pt refusing BiPAP, refusing [place] NC. Agreeable to use BiPAP for CT but then remove once done. Will remain on NC 6lpm at this time. MD aware

## 2021-09-30 NOTE — ED PROVIDER NOTE - PHYSICAL EXAMINATION
Attending Mago Jaimes: Gen: NAD, heent: atrauamtic, eomi, perrla, mmm, op pink, uvula midline, neck; nttp, no nuchal rigidity, chest: nttp, no crepitus, cv: rrr, +murmur, lungs: decreased at bases, abd: soft, nontender, nondistended, no peritoneal signs, +BS, no guarding, ext: wwp,bl LE edema, left greater then right, skin: no rash, neuro: awake and alert, following commands, speech clear, sensation and strength intact, no focal deficits

## 2021-09-30 NOTE — ED ADULT TRIAGE NOTE - MODE OF ARRIVAL
Received voicemail from Donna, , , stating she saw in [facility] that [place] from our office was trying to reach patient. She wants us to know that patient is currently in the hospital as of 6/23/21 at [place]. If we have questions, we may reach her at: [phone].     KALEIGH Mazariegos RN     Walk in

## 2021-09-30 NOTE — ED PROVIDER NOTE - PROGRESS NOTE DETAILS
Attending Mago Jaimes: pt afebriel rectally Attending Mago Jaimes: pocus tte limited with concern for possibel RV enlargement. no prior echo in EMR for comparison. concern for right sided heart failure. IVC is plethoric. lmimted bl pocus duplex without dvt will order comprehensive for further evaluation. pt is anaphylactic to contrast. will check d dimer Attending Mago Jaimes: pocus tte limited with concern for RV enlargement. no prior echo in EMR for comparison. concern for right sided heart failure. IVC is plethoric. lmimted bl pocus duplex without dvt will order comprehensive for further evaluation. pt is anaphylactic to contrast. will check d dimer

## 2021-09-30 NOTE — ED ADULT NURSE NOTE - NSIMPLEMENTINTERV_GEN_ALL_ED
Implemented All Fall Risk Interventions:  [place] to call system. Call bell, personal items and telephone within reach. Instruct patient to call for assistance. Room bathroom lighting operational. Non-slip footwear when patient is off stretcher. Physically safe environment: no spills, clutter or unnecessary equipment. Stretcher in lowest position, wheels locked, appropriate side rails in place. Provide visual cue, wrist band, yellow gown, etc. Monitor gait and stability. Monitor for mental status changes and reorient to person, place, and time. Review medications for side effects contributing to fall risk. Reinforce activity limits and safety measures with patient and family.

## 2021-10-01 NOTE — H&P ADULT - NSHPPHYSICALEXAM_GEN_ALL_CORE
Vital Signs Last 24 Hrs  T(C): 37 (30 Sep 2021 23:55), Max: 37.5 (30 Sep 2021 13:52)  T(F): 98.6 (30 Sep 2021 23:55), Max: 99.5 (30 Sep 2021 13:52)  HR: 86 (30 Sep 2021 23:55) (16 - 86)  BP: 135/77 (30 Sep 2021 23:55) (119/67 - 152/92)  BP(mean): 81 (30 Sep 2021 18:25) (81 - 98)  RR: 18 (30 Sep 2021 23:55) (16 - 28)  SpO2: 96% (30 Sep 2021 23:55) (79% - 100%) on 3LNC

## 2021-10-01 NOTE — CHART NOTE - NSCHARTNOTEFT_GEN_A_CORE
Relevant labs, chart notes, and flow sheet information reviewed along with IR Attending, Dr Zavaleta. Patient cleared for midline placement.

## 2021-10-01 NOTE — H&P ADULT - PROBLEM SELECTOR PLAN 2
-likely d/t worsening valvulopathy and less likely d/t dietary non-compliance vs medication non-adherence  -c/w lasix 40mg QD  -c/w lisinopril 20mg qd  -f/u TTE  -if e/o severe valvulopathy; consider CT surgery consult for repair  -Strict I/Os, daily weights, fluid restriction <2L/day, low-Na diet  -cards consult

## 2021-10-01 NOTE — PROGRESS NOTE ADULT - ASSESSMENT
71F PMH GAVE, severe MV/AV disease c/b recently dx'd CHF p/w 2-week h/o progressively worsening dyspnea a/w significantly dec ET and LE swelling. On presentation, found to be in respiratory distress initially requiring BIPAP. Labs significant for elevated BNP(39867). Admitted to medicine for decompensated heart failure likely 2/2 aortic vs mitral valvulopathy in s/o h/o rheumatic heart disease.

## 2021-10-01 NOTE — H&P ADULT - NSHPSOCIALHISTORY_GEN_ALL_CORE
Lives at home with her family, denies tobacco, drinks occasional glass of wine, denied illicit drugs

## 2021-10-01 NOTE — H&P ADULT - NSHPLABSRESULTS_GEN_ALL_CORE
13.1   6.66  )-----------( 237      ( 30 Sep 2021 16:43 )             42.0           147<H>  |  113<H>  |  32<H>  ----------------------------<  105<H>  4.2   |  18<L>  |  1.39<H>    Ca    9.0      30 Sep 2021 20:42  Phos  3.4       Mg     1.3         TPro  7.1  /  Alb  3.9  /  TBili  0.4  /  DBili  x   /  AST  30  /  ALT  21  /  AlkPhos  156<H>              LIVER FUNCTIONS - ( 30 Sep 2021 20:42 )  Alb: 3.9 g/dL / Pro: 7.1 g/dL / ALK PHOS: 156 U/L / ALT: 21 U/L / AST: 30 U/L / GGT: x             PT/INR - ( 30 Sep 2021 18:40 )   PT: 13.4 sec;   INR: 1.12 ratio         PTT - ( 30 Sep 2021 18:40 )  PTT:33.6 sec    Urinalysis Basic - ( 30 Sep 2021 18:48 )    Color: Light Yellow / Appearance: Clear / S.016 / pH: x  Gluc: x / Ketone: Negative  / Bili: Negative / Urobili: Negative   Blood: x / Protein: Negative / Nitrite: Negative   Leuk Esterase: Negative / RBC: 10 /hpf / WBC 1 /HPF   Sq Epi: x / Non Sq Epi: 1 /hpf / Bacteria: Negative      EKG and imaging have been reviewed

## 2021-10-01 NOTE — PROGRESS NOTE ADULT - SUBJECTIVE AND OBJECTIVE BOX
Andre Reyes, M.D.  Pager: [phone]  Office: [phone]    Patient is a 71y old  Female who presents with a chief complaint of Progressive dyspnea (01 Oct 2021 00:03)          SUBJECTIVE / OVERNIGHT EVENTS:    No acute overnight events.    ROS: (  ) Fever, (  )Chills,  (  )Nausea/Vomiting, (  ) Cough, (  )Shortness of breath, (  )Chest Pain    MEDICATIONS  (STANDING):  atorvastatin 20 milliGRAM(s) Oral at bedtime  enoxaparin Injectable 40 milliGRAM(s) SubCutaneous daily  famotidine    Tablet 20 milliGRAM(s) Oral daily  ferrous    sulfate 325 milliGRAM(s) Oral daily  furosemide   Injectable 40 milliGRAM(s) IV Push daily  influenza   Vaccine 0.5 milliLiter(s) IntraMuscular once  lisinopril 20 milliGRAM(s) Oral daily  magnesium oxide 400 milliGRAM(s) Oral two times a day with meals    MEDICATIONS  (PRN):  acetaminophen   Tablet .. 650 milliGRAM(s) Oral every 6 hours PRN Temp greater or equal to 38C (100.4F), Mild Pain (1 - 3)          T(C): 36.3 (10-01 @ 10:51), Max: 37.5 (09-30 @ 13:52)   HR: 82   BP: 102/61   RR: 18   SpO2: 97%    PHYSICAL EXAM:    CONSTITUTIONAL: NAD, well-developed, well-groomed  EYES: PERRLA; conjunctiva and sclera clear  ENMT: Moist oral mucosa, no pharyngeal injection or exudates; normal dentition  NECK: Supple, no palpable masses; no thyromegaly  RESPIRATORY: Normal respiratory effort; lungs are clear to auscultation bilaterally  CARDIOVASCULAR: Regular rate and rhythm, normal S1 and S2, no murmur/rub/gallop; No lower extremity edema; Peripheral pulses are 2+ bilaterally  ABDOMEN: Nontender to palpation, normoactive bowel sounds, no rebound/guarding; No hepatosplenomegaly  MUSCULOSKELETAL:  Normal gait; no clubbing or cyanosis of digits; no joint swelling or tenderness to palpation  PSYCH: A+O to person, place, and time; affect appropriate  NEUROLOGY: CN 2-12 are intact and symmetric; no gross sensory deficits   SKIN: No rashes; no palpable lesions      LABS:                        11.3   6.66  )-----------( 227      ( 01 Oct 2021 06:45 )             37.5      10-01    147<H>  |  112<H>  |  32<H>  ----------------------------<  107<H>  3.9   |  22  |  1.42<H>    Ca    9.0      01 Oct 2021 06:43  Phos  3.4     09-30  Mg     1.8     10-01    TPro  6.5  /  Alb  3.6  /  TBili  0.4  /  DBili  x   /  AST  20  /  ALT  15  /  AlkPhos  132<H>  10-01       CAPILLARY BLOOD GLUCOSE          RADIOLOGY & ADDITIONAL TESTS:    Imaging Personally Reviewed:  Consultant(s) Notes Reviewed:    Care Discussed with Consultants/Other Providers:   Andre Reyes, M.D.  Pager: [phone]  Office: [phone]    Patient is a 71y old  Female who presents with a chief complaint of Progressive dyspnea (01 Oct 2021 00:03)          SUBJECTIVE / OVERNIGHT EVENTS:    pt admitted overnight  pt with some improvement of her breathing this morning    MEDICATIONS  (STANDING):  atorvastatin 20 milliGRAM(s) Oral at bedtime  enoxaparin Injectable 40 milliGRAM(s) SubCutaneous daily  famotidine    Tablet 20 milliGRAM(s) Oral daily  ferrous    sulfate 325 milliGRAM(s) Oral daily  furosemide   Injectable 40 milliGRAM(s) IV Push daily  influenza   Vaccine 0.5 milliLiter(s) IntraMuscular once  lisinopril 20 milliGRAM(s) Oral daily  magnesium oxide 400 milliGRAM(s) Oral two times a day with meals    MEDICATIONS  (PRN):  acetaminophen   Tablet .. 650 milliGRAM(s) Oral every 6 hours PRN Temp greater or equal to 38C (100.4F), Mild Pain (1 - 3)          T(C): 36.3 (10-01 @ 10:51), Max: 37.5 (09-30 @ 13:52)   HR: 82   BP: 102/61   RR: 18   SpO2: 97%    PHYSICAL EXAM:    CONSTITUTIONAL: NAD, well-developed, well-groomed; obese  EYES: PERRLA; conjunctiva and sclera clear  ENMT: Moist oral mucosa, no pharyngeal injection or exudates; normal dentition  NECK: Supple, no palpable masses; no thyromegaly  RESPIRATORY: Normal respiratory effort; lungs are clear to auscultation bilaterally  CARDIOVASCULAR: Regular rate and rhythm, normal S1 and S2, + murmur; 2+lower extremity edema; Peripheral pulses are 2+ bilaterally  ABDOMEN: Nontender to palpation, normoactive bowel sounds, no rebound/guarding; No hepatosplenomegaly  MUSCULOSKELETAL:  Normal gait; no clubbing or cyanosis of digits; no joint swelling or tenderness to palpation  PSYCH: A+O to person, place, and time; affect appropriate  NEUROLOGY: CN 2-12 are intact and symmetric; no gross sensory deficits         LABS:                        11.3   6.66  )-----------( 227      ( 01 Oct 2021 06:45 )             37.5      10-01    147<H>  |  112<H>  |  32<H>  ----------------------------<  107<H>  3.9   |  22  |  1.42<H>    Ca    9.0      01 Oct 2021 06:43  Phos  3.4     09-30  Mg     1.8     10-01    TPro  6.5  /  Alb  3.6  /  TBili  0.4  /  DBili  x   /  AST  20  /  ALT  15  /  AlkPhos  132<H>  10-01       CAPILLARY BLOOD GLUCOSE          RADIOLOGY & ADDITIONAL TESTS:    Imaging Personally Reviewed:  Consultant(s) Notes Reviewed:    Care Discussed with Consultants/Other Providers:

## 2021-10-01 NOTE — H&P ADULT - PROBLEM SELECTOR PLAN 1
-likely d/t acute decompensated heart failure  -improved s/p BIPAP in ED  -ensure BIPAP at bedside  -supplemental O2 via NC  -manage for decompensated heart failure

## 2021-10-01 NOTE — H&P ADULT - NSICDXPASTMEDICALHX_GEN_ALL_CORE_FT
PAST MEDICAL HISTORY:  CHF due to valvular disease     H/O: rheumatic fever     Heart valve disease     HLD (hyperlipidemia)     HTN (hypertension)

## 2021-10-01 NOTE — H&P ADULT - PROBLEM SELECTOR PLAN 7
-likely pre-renal in s/o decompensated heart failure  -treat for decompensated heart failure  -avoid nephrotoxic medications  -renally dose all meds

## 2021-10-01 NOTE — H&P ADULT - HISTORY OF PRESENT ILLNESS
71F PMH GAVE, severe MV/AV disease c/b recently dx'd CHF p/w 2-week h/o progressively worsening dyspnea. She reports significantly reduced ET to the point she can't even walk to the bathroom without feeling short of breath. She also has associated orthopnea and worsening leg swelling. She also endorses a cough productive of whitish phlegm. 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 She was told she would need replacement/repair of her valve(s)  however declined due to potential blood transfusion for blood loss during surgery as she is a Jehov[employer] Witness. 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 resulting in anemia d/t blood loss that has required blood transfusions in the past.  Denied HA, lightheadedness, palpitations, CP, abdominal pain, melena/hematochezia, dietary indiscretion or medication non-adherence.    ED course: Lasix IV 40mg x1, MgSO4 1mg x2

## 2021-10-01 NOTE — H&P ADULT - ASSESSMENT
71F PMH GAVE, severe MV/AV disease c/b recently dx'd CHF p/w 2-week h/o progressively worsening dyspnea a/w significantly dec ET and LE swelling. On presentation, found to be in respiratory distress initially requiring BIPAP. Labs significant for elevated BNP(70491). Admitted to medicine for decompensated heart failure likely 2/2 aortic vs mitral valvulopathy in s/o h/o rheumatic heart disease.

## 2021-10-02 NOTE — PROGRESS NOTE ADULT - SUBJECTIVE AND OBJECTIVE BOX
Mohsin Khan, MD  Attending Physician, Division Of Hospital Medicine  Pager: [phone], Office: [phone]  Off hour pager: [phone]    Patient is a 71y old  Female who presents with a chief complaint of Progressive dyspnea     SUBJECTIVE / OVERNIGHT EVENTS:  Feels lot better, no SOB in rest but on exertion, no c/o chest pain, fever. LE swelling is improving. Tele- no events    MEDICATIONS  (STANDING):  atorvastatin 20 milliGRAM(s) Oral at bedtime  enoxaparin Injectable 40 milliGRAM(s) SubCutaneous daily  famotidine    Tablet 20 milliGRAM(s) Oral daily  ferrous    sulfate 325 milliGRAM(s) Oral daily  furosemide   Injectable 40 milliGRAM(s) IV Push daily  influenza   Vaccine 0.5 milliLiter(s) IntraMuscular once  lisinopril 20 milliGRAM(s) Oral daily  magnesium oxide 400 milliGRAM(s) Oral two times a day with meals    MEDICATIONS  (PRN):  acetaminophen   Tablet .. 650 milliGRAM(s) Oral every 6 hours PRN Temp greater or equal to 38C (100.4F), Mild Pain (1 - 3)      Vital Signs Last 24 Hrs  T(C): 36.6 (02 Oct 2021 10:00), Max: 36.9 (01 Oct 2021 20:37)  T(F): 97.8 (02 Oct 2021 10:00), Max: 98.5 (01 Oct 2021 20:37)  HR: 83 (02 Oct 2021 10:00) (72 - 93)  BP: 100/68 (02 Oct 2021 10:00) (100/68 - 108/72)  BP(mean): --  RR: 18 (02 Oct 2021 10:00) (18 - 18)  SpO2: 94% (02 Oct 2021 10:00) (92% - 97%)  CAPILLARY BLOOD GLUCOSE        I&O's Summary    01 Oct 2021 07:01  -  02 Oct 2021 07:00  --------------------------------------------------------  IN: 960 mL / OUT: 1000 mL / NET: -40 mL        PHYSICAL EXAM:-  GENERAL: NAD, well-developed  EYES: EOMI, PERRLA, conjunctiva and sclera clear  NECK: Supple, No JVD, no thyromegaly  CHEST/LUNG: Clear to auscultation bilaterally; No wheeze  HEART: Regular rate and rhythm; S1, S2 audible, Aortic and mitral systolic murmurs, rubs, or gallops  ABDOMEN: Soft, Nontender, Nondistended; Bowel sounds present  EXTREMITIES:  2+ Peripheral Pulses, No clubbing, cyanosis, 2+ LE edema  NEURO: AAOx3, no focal deficit      LABS:                        11.3   6.66  )-----------( 227      ( 01 Oct 2021 06:45 )             37.5     10-    141  |  107  |  39<H>  ----------------------------<  96  3.8   |  20<L>  |  1.53<H>    Ca    9.4      02 Oct 2021 07:08  Phos  3.4     09-30  Mg     1.6     10-    TPro  6.5  /  Alb  3.6  /  TBili  0.4  /  DBili  x   /  AST  20  /  ALT  15  /  AlkPhos  132<H>  10-01    PT/INR - ( 30 Sep 2021 18:40 )   PT: 13.4 sec;   INR: 1.12 ratio         PTT - ( 30 Sep 2021 18:40 )  PTT:33.6 sec      Urinalysis Basic - ( 30 Sep 2021 18:48 )    Color: Light Yellow / Appearance: Clear / S.016 / pH: x  Gluc: x / Ketone: Negative  / Bili: Negative / Urobili: Negative   Blood: x / Protein: Negative / Nitrite: Negative   Leuk Esterase: Negative / RBC: 10 /hpf / WBC 1 /HPF   Sq Epi: x / Non Sq Epi: 1 /hpf / Bacteria: Negative        RADIOLOGY & ADDITIONAL TESTS:    Imaging Personally Reviewed: CXR, Echo  Care Discussed with Consultants/Other Providers: Cardiology

## 2021-10-02 NOTE — PROGRESS NOTE ADULT - ASSESSMENT
71F PMH GAVE, severe MV/AV disease c/b recently dx'd CHF p/w 2-week h/o progressively worsening dyspnea a/w significantly dec ET and LE swelling. On presentation, found to be in respiratory distress initially requiring BIPAP. Labs significant for elevated BNP(01297). Admitted to medicine for decompensated heart failure likely 2/2 aortic vs mitral valvulopathy in s/o h/o rheumatic heart disease.

## 2021-10-02 NOTE — CONSULT NOTE ADULT - SUBJECTIVE AND OBJECTIVE BOX
All Cardiology service information can be found  on [email or web address], password: [email or web address]    Patient seen and evaluated at bedside    Chief Complaint: SOB     HPI:  71F PMH gastric antral vascular ectasia, mod MS / mod AS (rheumatic), HFpEF presenting with 2 weeks of progressively worsening dyspnea - unable to walk to the .  CHF p/w 2-week h/o progressively worsening dyspnea. She reports significantly reduced ET to the point she can't even walk to the bathroom without feeling short of breath. She also has associated orthopnea and worsening leg swelling. She also endorses a cough productive of whitish phlegm. 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 She was told she would need replacement/repair of her valve(s)  however declined due to potential blood transfusion for blood loss during surgery as she is a [employer]. 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 resulting in anemia d/t blood loss that has required blood transfusions in the past.  Denied HA, lightheadedness, palpitations, CP, abdominal pain, melena/hematochezia, dietary indiscretion or medication non-adherence.    ED course: Lasix IV 40mg x1, MgSO4 1mg x2   (01 Oct 2021 00:03)      PMHx:   No pertinent past medical history    CHF due to valvular disease    HTN (hypertension)    Heart valve disease    H/O: rheumatic fever    HLD (hyperlipidemia)        PSHx:       Allergies:  Bactrim (Anaphylaxis)  codine (Unknown)  contrast media (iodine-based) (Anaphylaxis)  mercury (Unknown)      Home Meds:    Current Medications:   acetaminophen   Tablet .. 650 milliGRAM(s) Oral every 6 hours PRN  atorvastatin 20 milliGRAM(s) Oral at bedtime  enoxaparin Injectable 40 milliGRAM(s) SubCutaneous daily  ferrous    sulfate 325 milliGRAM(s) Oral daily  furosemide   Injectable 40 milliGRAM(s) IV Push daily  influenza   Vaccine 0.5 milliLiter(s) IntraMuscular once  lisinopril 20 milliGRAM(s) Oral daily  magnesium oxide 400 milliGRAM(s) Oral two times a day with meals  pantoprazole    Tablet 40 milliGRAM(s) Oral before breakfast      FAMILY HISTORY:  No pertinent family history in first degree relatives    Social History:  Smoking History:  Alcohol Use:  Drug Use:    REVIEW OF SYSTEMS:  CONSTITUTIONAL: No weakness, fevers or chills  EYES/ENT: No visual changes;  No dysphagia  NECK: No pain or stiffness  RESPIRATORY: No cough, wheezing, hemoptysis; No shortness of breath  CARDIOVASCULAR: No chest pain or palpitations; No lower extremity edema  GASTROINTESTINAL: No abdominal or epigastric pain. No nausea, vomiting, or hematemesis; No diarrhea or constipation. No melena or hematochezia.  BACK: No back pain  GENITOURINARY: No dysuria, frequency or hematuria  NEUROLOGICAL: No numbness or weakness  SKIN: No itching, burning, rashes, or lesions   All other review of systems is negative unless indicated above.    Physical Exam:  T(F): 98.5 (10-02), Max: 98.5 (10-01)  HR: 85 (10-02) (72 - 93)  BP: 111/70 (10-02) (100/68 - 111/70)  RR: 18 (10-02)  SpO2: 92% (10-02)  GENERAL: No acute distress, well-developed  HEAD:  Atraumatic, Normocephalic  ENT: EOMI, PERRLA, conjunctiva and sclera clear, Neck supple, No JVD, moist mucosa  CHEST/LUNG: Clear to auscultation bilaterally; No wheeze, equal breath sounds bilaterally   BACK: No spinal tenderness  HEART: Regular rate and rhythm; No murmurs, rubs, or gallops  ABDOMEN: Soft, Nontender, Nondistended; Bowel sounds present  EXTREMITIES:  No clubbing, cyanosis, or edema  PSYCH: Nl behavior, nl affect  NEUROLOGY: AAOx3, non-focal, cranial nerves intact  SKIN: Normal color, No rashes or lesions  LINES:    Cardiovascular Diagnostic Testing:    ECG: Personally reviewed:    Echo: Personally reviewed:    Stress Testing:    Cath:    Imaging:    CXR: Personally reviewed    Labs: Personally reviewed                        11.3   6.66  )-----------( 227      ( 01 Oct 2021 06:45 )             37.5     10-02    141  |  107  |  39<H>  ----------------------------<  96  3.8   |  20<L>  |  1.53<H>    Ca    9.4      02 Oct 2021 07:08  Phos  3.4       Mg     1.6     10-02    TPro  6.5  /  Alb  3.6  /  TBili  0.4  /  DBili  x   /  AST  20  /  ALT  15  /  AlkPhos  132<H>  10-01    PT/INR - ( 30 Sep 2021 18:40 )   PT: 13.4 sec;   INR: 1.12 ratio         PTT - ( 30 Sep 2021 18:40 )  PTT:33.6 sec    CARDIAC MARKERS ( 30 Sep 2021 20:42 )  19 ng/L / x     / x     / x     / x     / x      CARDIAC MARKERS ( 30 Sep 2021 16:43 )  20 ng/L / x     / x     / x     / x     / x            Serum Pro-Brain Natriuretic Peptide: 12838 pg/mL ( @ 16:43)    Total Cholesterol: 108  LDL: --  HDL: 35  T           All Cardiology service information can be found  on [email or web address], password: [email or web address]    Patient seen and evaluated at bedside    Chief Complaint: SOB     HPI:  71F PMH gastric antral vascular ectasia, mod MS / mod AS (rheumatic), HFpEF presenting with 2 weeks of progressively worsening dyspnea - unable to walk to across the room also with associated orthopnea and leg swelling. Symptoms associated with cough productive of white sputum. Reportedly has been told she may need valve surgery in the past however declined due to potential blood transfusion for blood loss during surgery as she is a Jehov[employer] Witness. Patient currently states she is feeling slightly better, no symptoms at rest but does experience SOB with exertion. Otherwise denies CP palpitations dizziness syncope.     PMHx:   No pertinent past medical history  CHF due to valvular disease  HTN (hypertension)  Heart valve disease  H/O: rheumatic fever  HLD (hyperlipidemia)    Allergies:  Bactrim (Anaphylaxis)  codine (Unknown)  contrast media (iodine-based) (Anaphylaxis)  mercury (Unknown)    Current Medications:   acetaminophen   Tablet .. 650 milliGRAM(s) Oral every 6 hours PRN  atorvastatin 20 milliGRAM(s) Oral at bedtime  enoxaparin Injectable 40 milliGRAM(s) SubCutaneous daily  ferrous    sulfate 325 milliGRAM(s) Oral daily  furosemide   Injectable 40 milliGRAM(s) IV Push daily  influenza   Vaccine 0.5 milliLiter(s) IntraMuscular once  lisinopril 20 milliGRAM(s) Oral daily  magnesium oxide 400 milliGRAM(s) Oral two times a day with meals  pantoprazole    Tablet 40 milliGRAM(s) Oral before breakfast    FAMIL HISTORY:  No pertinent family history in first degree relatives    REVIEW OF SYSTEMS:  CONSTITUTIONAL: No weakness, fevers or chills  EYES/ENT: No visual changes;  No dysphagia  NECK: No pain or stiffness  RESPIRATORY: No, wheezing, hemoptysis  CARDIOVASCULAR: No chest pain or palpitations  GASTROINTESTINAL: No abdominal or epigastric pain. No nausea, vomiting, or hematemesis; No diarrhea or constipation. No melena or hematochezia.  BACK: No back pain  GENITOURINARY: No dysuria, frequency or hematuria  NEUROLOGICAL: No numbness or weakness  SKIN: No itching, burning, rashes, or lesions   All other review of systems is negative unless indicated above.    Physical Exam:  T(F): 98.5 (10-), Max: 98.5 (10-01)  HR: 85 (10-02) (72 - 93)  BP: 111/70 (10-02) (100/68 - 111/70)  RR: 18 (10-02)  SpO2: 92% (10-02)  GENERAL: No acute distress, well-developed  CHEST/LUNG: mild crackles in b/l bases   BACK: No spinal tenderness  HEART: Regular rate and rhythm; 3/6 systolic murmur, able to appreciate S2  ABDOMEN: Soft, Nontender, Nondistended; Bowel sounds present  EXTREMITIES: WWP, mild edema   PSYCH: Nl behavior, nl affect  NEUROLOGY: AAOx3, non-focal, cranial nerves intact  SKIN: Normal color, No rashes or lesions  LINES:    Echo: Conclusions:  1. Mitral annular calcification and calcified mitral  leaflets with decreased diastolic opening. Mild mitral  regurgitation.  Peak mitral valve gradient equals 19 mm Hg,  mean transmitral valve gradient equals 7mm Hg, consistent  with moderate mitral stenosis.  Gradients measured at a HR  of 87bpm.  2. Calcified aortic valve with decreased opening. Peak  transaortic valve gradient equals 28 mm Hg, mean  transaortic valve gradient equals 15 mm Hg, estimated  aortic valve area equals 1.3 sqcm (by continuity equation),  the DI is 0.46, aortic valve velocity time integral equals  43 cm, consistent with moderate aortic stenosis.  however on visual inspection the aortic valve stenosis  appears to be severe. Minimal aortic regurgitation.  3. Hyperdynamic left ventricular systolic function.  4. Moderate diastolic dysfunction (Stage II).  5. Right ventricular enlargement with decreased right  ventricular systolic function.  *** No previous Echo exam.    Imaging:    CXR: Personally reviewed    Labs: Personally reviewed                        11.3   6.66  )-----------( 227      ( 01 Oct 2021 06:45 )             37.5     10-02    141  |  107  |  39<H>  ----------------------------<  96  3.8   |  20<L>  |  1.53<H>    Ca    9.4      02 Oct 2021 07:08  Phos  3.4     09-30  Mg     1.6     10-02    TPro  6.5  /  Alb  3.6  /  TBili  0.4  /  DBili  x   /  AST  20  /  ALT  15  /  AlkPhos  132<H>  10-01    PT/INR - ( 30 Sep 2021 18:40 )   PT: 13.4 sec;   INR: 1.12 ratio      PTT - ( 30 Sep 2021 18:40 )  PTT:33.6 sec    CARDIAC MARKERS ( 30 Sep 2021 20:42 )  19 ng/L / x     / x     / x     / x     / x      CARDIAC MARKERS ( 30 Sep 2021 16:43 )  20 ng/L / x     / x     / x     / x     / x        Serum Pro-Brain Natriuretic Peptide: 88235 pg/mL ( @ 16:43)    Total Cholesterol: 108  LDL: --  HDL: 35  T    71F PMH gastric antral vascular ectasia, mod MS / mod AS (rheumatic), HFpEF presenting with 2 weeks of progressively worsening dyspnea, found to have borderline AS (moderate by gradients, severe by appearance).     - SOB may be in setting of increased filling pressures due to possibly severe AS, component of MS   - can continue gentle diuresis as written  - monitor lytes, K > 4 Mg > 2  - Strict I/Os, daily weights   - monitor on tele   - avoid large shifts in preload or afterload   - will discuss further imaging to elucidate the severity of aortic stenosis vs. structural eval  - cardiology will continue to follow

## 2021-10-03 NOTE — PROGRESS NOTE ADULT - ASSESSMENT
71F PMH GAVE, severe MV/AV disease c/b recently dx'd CHF p/w 2-week h/o progressively worsening dyspnea a/w significantly dec ET and LE swelling. On presentation, found to be in respiratory distress initially requiring BIPAP. Labs significant for elevated BNP(96847). Admitted to medicine for decompensated heart failure likely 2/2 aortic vs mitral valvulopathy in s/o h/o rheumatic heart disease.

## 2021-10-03 NOTE — PROGRESS NOTE ADULT - SUBJECTIVE AND OBJECTIVE BOX
Mohsin Khan, MD  Attending Physician, Division Of Hospital Medicine  Pager: [phone], Office: [phone]  Off hour pager: [phone]    Patient is a 71y old  Female who presents with a chief complaint of Progressive dyspnea     SUBJECTIVE / OVERNIGHT EVENTS:  Seen, examined the patient this am  Sitting on a chair, no acute SOB, chest pain, last night had panic attack, Tele- SR controlled rate, SBP     MEDICATIONS  (STANDING):  atorvastatin 20 milliGRAM(s) Oral at bedtime  enoxaparin Injectable 40 milliGRAM(s) SubCutaneous daily  ferrous    sulfate 325 milliGRAM(s) Oral daily  furosemide   Injectable 40 milliGRAM(s) IV Push daily  influenza   Vaccine 0.5 milliLiter(s) IntraMuscular once  magnesium oxide 400 milliGRAM(s) Oral two times a day with meals  pantoprazole    Tablet 40 milliGRAM(s) Oral before breakfast    MEDICATIONS  (PRN):  acetaminophen   Tablet .. 650 milliGRAM(s) Oral every 6 hours PRN Temp greater or equal to 38C (100.4F), Mild Pain (1 - 3)      Vital Signs Last 24 Hrs  T(C): 36.5 (03 Oct 2021 05:00), Max: 36.8 (02 Oct 2021 20:59)  T(F): 97.7 (03 Oct 2021 05:00), Max: 98.3 (02 Oct 2021 20:59)  HR: 83 (03 Oct 2021 08:33) (81 - 91)  BP: 97/61 (03 Oct 2021 08:33) (90/57 - 100/68)  BP(mean): --  RR: 18 (03 Oct 2021 07:23) (18 - 18)  SpO2: 94% (03 Oct 2021 07:23) (92% - 95%)  CAPILLARY BLOOD GLUCOSE        I&O's Summary    02 Oct 2021 07:01  -  03 Oct 2021 07:00  --------------------------------------------------------  IN: 860 mL / OUT: 1000 mL / NET: -140 mL        PHYSICAL EXAM:-  GENERAL: NAD, well-developed  EYES: EOMI, PERRLA, conjunctiva and sclera clear  NECK: Supple, No JVD, no thyromegaly  CHEST/LUNG: Clear to auscultation bilaterally; No wheeze  HEART: Regular rate and rhythm; S1, S2 audible, No murmurs, rubs, or gallops  ABDOMEN: Soft, Nontender, Nondistended; Bowel sounds present  EXTREMITIES:  2+ Peripheral Pulses, No clubbing, cyanosis, or edema  NEURO: AAOx3, no focal deficit      LABS:                        11.1   8.52  )-----------( 235      ( 03 Oct 2021 06:07 )             35.5     10-03    140  |  107  |  54<H>  ----------------------------<  130<H>  3.9   |  18<L>  |  2.02<H>    Ca    9.3      03 Oct 2021 06:06  Phos  4.4     10-03  Mg     1.7     10-03      RADIOLOGY & ADDITIONAL TESTS:    Imaging Personally Reviewed: CXR, echo  Consultant(s) Notes Reviewed: Cardiology  Care Discussed with Consultants/Other Providers: Cardiology

## 2021-10-04 NOTE — PROGRESS NOTE ADULT - ASSESSMENT
71F PMH gastric antral vascular ectasia, mod MS / mod AS (rheumatic), HFpEF presenting with 2 weeks of progressively worsening dyspnea, found to have borderline AS (moderate by gradients, severe by appearance).     - SOB may be in setting of increased filling pressures due to possibly severe AS, component of MS   - can continue gentle diuresis as written  - monitor lytes, K > 4 Mg > 2  - Strict I/Os, daily weights   - monitor on tele   - avoid large shifts in preload or afterload   - will discuss further imaging to elucidate the severity of aortic stenosis vs. structural eval  - cardiology will continue to follow  71F PMH gastric antral vascular ectasia, mod MS / mod AS (rheumatic) and HFpEF.  Presented with 2 weeks of progressively worsening dyspnea, found to have borderline AS (moderate by gradients, severe by appearance).     REC:  - SOB may be in setting of increased filling pressures due to possibly severe AS, component of MS   - can continue gentle diuresis as written  - monitor lytes, K > 4 Mg > 2  - Strict I/Os, daily weights   - monitor on tele   - avoid large shifts in preload or afterload   - will discuss further imaging to elucidate the severity of aortic stenosis vs. structural eval  - Cardiology will continue to follow       Angelita Prince M.D.  Cardiology fellow    Plan discussed with cardiology fellow; patient seen and examined.       I was physically present for the key portions of the evaluation and management (E/M) service provided.    I agree with the above history, physical, and plan which I have reviewed and edited where appropriate.   Leodan Almodovar M.D.  Cardiology Attending, Consult Service    For Cardiology consults and questions, all Cardiology service information can be found 24/7 on [email or web address], password: [email or web address]

## 2021-10-04 NOTE — PROGRESS NOTE ADULT - ATTENDING COMMENTS
Signs/symptoms of led to the patient's current hospitalization were reviewed.  The patient's past medical history/past surgical history/family history/social history was gone over.  Agree with physical nation as noted above.    Patient is clinically doing better following diuresis and medical optimization.  She notes that over the last few months she has gained approximately 30 pounds of fluid weight with worsening lower extremity edema, dyspnea upon exertion and fatigue.  TTE demonstrated increase in gradients across her bioprosthetic/TAVR valve.  The patient had a Elizabeth 32+2 cc inserted.  Elevated gradients were noted following the procedure concern for potential patient prosthesis mismatch likely secondary to the patient body size/habitus.    There is concern that the patient has developed further degeneration of her valve which may be secondary to subclinical leaflet thrombosis.  She is not on any type of anticoagulation therapy.  For further assessment of the etiology of the patient's valve degeneration it is recommended that a JONE and TAVR CTA be performed.  Indications and details for these procedures were reviewed with the patient.  Benefits and risks were discussed.    For medical optimization the patient will be started with Toprol tartrate 12.5 mg twice a day.  We will need to uptitrate beta-blocker for optimization of her CTA study.    Would consider starting the patient on heparin with close monitoring of PTT and assess for signs or symptoms of bleeding.    All questions and concerns of the patient were addressed.    EKG, laboratory studies and imaging studies were personally reviewed.    Findings and plan were discussed with cardiology/Dr. Almodovar and structural heart team.

## 2021-10-04 NOTE — CONSULT NOTE ADULT - SUBJECTIVE AND OBJECTIVE BOX
HPI:  71F PMH GAVE, severe MV/AV disease c/b recently dx'd CHF p/w 2-week h/o progressively worsening dyspnea. She reports significantly reduced ET to the point she can't even walk to the bathroom without feeling short of breath. She also has associated orthopnea and worsening leg swelling. She also endorses a cough productive of whitish phlegm. 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 She was told she would need replacement/repair of her valve(s)  however declined due to potential blood transfusion for blood loss during surgery as she is a Jehovah Witness. 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 resulting in anemia d/t blood loss that has required blood transfusions in the past.  Denied HA, lightheadedness, palpitations, CP, abdominal pain, melena/hematochezia, dietary indiscretion or medication non-adherence.    ED course: Lasix IV 40mg x1, MgSO4 1mg x2   (01 Oct 2021 00:03)    Consulted for evaluation of AS and MR and consideration for structural intervention. She has been evaluated at [facility] (JONE, CT, and cardiac catheterization), but opted to pursue evaluation here at [place] due to her Taoist beliefs and preference for bloodless surgery.       PAST MEDICAL & SURGICAL HISTORY:  CHF due to valvular disease    HTN (hypertension)    Heart valve disease    H/O: rheumatic fever    HLD (hyperlipidemia)        REVIEW OF SYSTEMS:    CONSTITUTIONAL: No weakness, fevers or chills  EYES/ENT: No visual changes;  No vertigo or throat pain   NECK: No pain or stiffness  RESPIRATORY: No cough, wheezing, hemoptysis; No shortness of breath at rest  CARDIOVASCULAR: No chest pain or palpitations, PND, orthopnea, or dizziness, (+) exertional dyspnea, (+) pedal edema  GASTROINTESTINAL: No abdominal or epigastric pain. No nausea, vomiting, or hematemesis; No diarrhea or constipation. No melena or hematochezia.  GENITOURINARY: No dysuria, frequency or hematuria  NEUROLOGICAL: No numbness or weakness  SKIN: No itching, rashes      MEDICATIONS  (STANDING):  atorvastatin 20 milliGRAM(s) Oral at bedtime  enoxaparin Injectable 40 milliGRAM(s) SubCutaneous daily  ferrous    sulfate 325 milliGRAM(s) Oral daily  furosemide   Injectable 40 milliGRAM(s) IV Push daily  influenza   Vaccine 0.5 milliLiter(s) IntraMuscular once  magnesium oxide 400 milliGRAM(s) Oral two times a day with meals  pantoprazole    Tablet 40 milliGRAM(s) Oral before breakfast    MEDICATIONS  (PRN):  acetaminophen   Tablet .. 650 milliGRAM(s) Oral every 6 hours PRN Temp greater or equal to 38C (100.4F), Mild Pain (1 - 3)      Allergies    Bactrim (Anaphylaxis)  codine (Unknown)  contrast media (iodine-based) (Anaphylaxis)  mercury (Unknown)    Intolerances        SOCIAL HISTORY:    FAMILY HISTORY:  No pertinent family history in first degree relatives        Vital Signs Last 24 Hrs  T(C): 36.8 (04 Oct 2021 13:28), Max: 37.5 (03 Oct 2021 20:49)  T(F): 98.2 (04 Oct 2021 13:28), Max: 99.5 (03 Oct 2021 20:49)  HR: 98 (04 Oct 2021 13:28) (86 - 100)  BP: 101/65 (04 Oct 2021 13:28) (86/58 - 135/83)  BP(mean): --  RR: 18 (04 Oct 2021 13:28) (18 - 18)  SpO2: 92% (04 Oct 2021 13:28) (92% - 94%)    Physical Exam  General: A/ox 3, No acute Distress  Neck: Supple, NO JVD  Cardiac: S1 S2, I/VI systolic murmur  Pulmonary: Breathing unlabored, No Rhonchi/Rales/Wheezing, Diminished at bases bilaterally  Abdomen: Soft, Non -tender, +BS x 4 quads  Extremities: No Rashes, (+) BLE edema  Neuro: A/o x 3, No focal deficits    LABS:                        11.9   8.00  )-----------( 232      ( 04 Oct 2021 06:29 )             39.2     10-04    141  |  106  |  53<H>  ----------------------------<  109<H>  4.1   |  19<L>  |  1.60<H>    Ca    9.6      04 Oct 2021 06:29  Phos  3.4     10-04  Mg     1.6     10-04    TPro  7.0  /  Alb  3.8  /  TBili  0.6  /  DBili  x   /  AST  23  /  ALT  18  /  AlkPhos  149<H>  10-04          RADIOLOGY & ADDITIONAL STUDIES:  < from: TTE with Doppler (w/3D Echo) (Transthoracic Echocardiogram) (10.01.21 @ 14:41) >  Patient name: MATHEW LAMBERT  YOB: 1950   Age: 71 (F)   MR#: [id number]  Study Date: 10/1/2021  Location: [address] [place], [place] [id number]: [facility]  Study quality: Difficult; Patient scanned sup  Referring Physician: MD Gladys  Blood Pressure: 102/61 mmHg  Height: 163 cm  Weight: 99 kg  BSA: 2 m2  Heart Rate: 83 mmHg  ------------------------------------------------------------------------  PROCEDURE: Transthoracic echocardiogram with 2-D, M-Mode  and complete spectral and color flow Doppler. Real-time and  reconstructed 3-dimensional imaging was performed.  Color  Doppler analysis was carried out.  INDICATION: Dyspnea, unspecified (R06.00)  ------------------------------------------------------------------------  Dimensions:    Normal Values:  LA:     4.8    2.0 - 4.0 cm  Ao:     2.9    2.0 - 3.8 cm  SEPTUM: 1.6    0.6 - 1.2 cm  PWT:    1.3    0.6 - 1.1 cm  LVIDd:  2.9    3.0 - 5.6 cm  LVIDs:  1.7    1.8 - 4.0 cm  Derived variables:  LVMI: 74 g/m2  RWT:0.90  Fractional short: 41 %  EF (Escobar Rule): 77 %Doppler Peak Velocity (m/sec):  MV=2.2 AoV=2.6  ------------------------------------------------------------------------  Observations:  Mitral Valve: Mitral annular calcification and calcified  mitral leaflets with decreased diastolic opening. Mild  mitral regurgitation.  Peak mitral valve gradient equals 19  mm Hg, mean transmitral valve gradient equals 7 mm Hg,  consistent with moderate mitral stenosis.  Gradients  measured at a HR of 87bpm.  Aortic Valve/Aorta: Calcified aortic valve with decreased  opening. Peak transaortic valve gradient equals 28 mm Hg,  mean transaortic valve gradient equals 15 mm Hg, estimated  aortic valve area equals 1.3 sqcm (by continuity equation),  the DI is 0.46, aortic valve velocity time integral equals  43 cm, consistent with moderate aortic stenosis.  however on visual inspection the aortic valve stenosis  appears to be severe. Minimal aortic regurgitation.  Peak  left ventricular outflow tract gradient equals 5 mm Hg,  mean gradient is equal to 3 mm Hg, LVOT velocity time  integral equals 20 cm.  Aortic Root: 2.9 cm.  LVOT diameter: 2 cm.  Left Atrium: Severely dilated left atrium.  LA volume index  = 71 cc/m2.  Left Ventricle: Hyperdynamic leftventricular systolic  function. Mild concentric left ventricular hypertrophy with  proximal septal thickening. Moderate diastolic dysfunction  (Stage II).  Right Heart: Severe right atrial enlargement. Right  ventricular enlargement with decreased right ventricular  systolic function. Tethered tricuspid valve.  Moderate-severe tricuspid regurgitation. Normal pulmonic  valve. Minimal pulmonic regurgitation.  Pericardium/Pleura: Normal pericardium with trace  pericardial effusion.  Hemodynamic: Estimated right atrial pressure is 8 mm Hg.  Estimated right ventricular systolic pressure equals 111 mm  Hg, assuming right atrial pressure equals 8 mm Hg,  consistent with severe pulmonary hypertension.  ------------------------------------------------------------------------  Conclusions:  1. Mitral annular calcification and calcified mitral  leaflets with decreased diastolic opening. Mild mitral  regurgitation.  Peak mitral valve gradient equals 19 mm Hg,  mean transmitral valve gradient equals 7mm Hg, consistent  with moderate mitral stenosis.  Gradients measured at a HR  of 87bpm.  2. Calcified aortic valve with decreased opening. Peak  transaortic valve gradient equals 28 mm Hg, mean  transaortic valve gradient equals 15 mm Hg, estimated  aortic valve area equals 1.3 sqcm (by continuity equation),  the DI is 0.46, aortic valve velocity time integral equals  43 cm, consistent with moderate aortic stenosis.  however on visual inspection the aortic valve stenosis  appears to be severe. Minimal aortic regurgitation.  3. Hyperdynamic left ventricular systolic function.  4. Moderate diastolic dysfunction (Stage II).  5. Right ventricular enlargement with decreased right  ventricular systolic function.  *** No previous Echo exam.  ------------------------------------------------------------------------  Confirmed on  10/1/2021 - 16:41:32 by Dimas Mariano M.D.  ------------------------------------------------------------------------    < end of copied text >

## 2021-10-04 NOTE — CONSULT NOTE ADULT - SUBJECTIVE AND OBJECTIVE BOX
per HPI:  71F PMH GAVE, severe MV/AV disease c/b recently dx'd CHF p/w 2-week h/o progressively worsening dyspnea. She reports significantly reduced ET to the point she can't even walk to the bathroom without feeling short of breath. She also has associated orthopnea and worsening leg swelling. She also endorses a cough productive of whitish phlegm. 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 She was told she would need replacement/repair of her valve(s)  however declined due to potential blood transfusion for blood loss during surgery as she is a Jehovah Witness. 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 resulting in anemia d/t blood loss that has required blood transfusions in the past.  Denied HA, lightheadedness, palpitations, CP, abdominal pain, melena/hematochezia, dietary indiscretion or medication non-adherence.  She has been evaluated at [facility] (JONE, CT, and cardiac catheterization), but opted to pursue evaluation here at [place] due to her Yarsani beliefs and preference for bloodless surgery.     ED course: Lasix IV 40mg x1, MgSO4 1mg x2     CT surgery consulted for consideration of open surgical mitral  and aortic valve replacement due to MS/AS.     PAST MEDICAL & SURGICAL HISTORY:  CHF due to valvular disease    HTN (hypertension)    Heart valve disease    H/O: rheumatic fever    HLD (hyperlipidemia)        REVIEW OF SYSTEMS:    CONSTITUTIONAL: No weakness, fevers or chills  EYES/ENT: No visual changes;  No vertigo or throat pain   NECK: No pain or stiffness  RESPIRATORY: No cough, wheezing, hemoptysis; No shortness of breath at rest  CARDIOVASCULAR: No chest pain or palpitations, PND, orthopnea, or dizziness, (+) exertional dyspnea, (+) pedal edema  GASTROINTESTINAL: No abdominal or epigastric pain. No nausea, vomiting, or hematemesis; No diarrhea or constipation. No melena or hematochezia.  GENITOURINARY: No dysuria, frequency or hematuria  NEUROLOGICAL: No numbness or weakness  SKIN: No itching, rashes      MEDICATIONS  (STANDING):  atorvastatin 20 milliGRAM(s) Oral at bedtime  enoxaparin Injectable 40 milliGRAM(s) SubCutaneous daily  ferrous    sulfate 325 milliGRAM(s) Oral daily  furosemide   Injectable 40 milliGRAM(s) IV Push daily  influenza   Vaccine 0.5 milliLiter(s) IntraMuscular once  magnesium oxide 400 milliGRAM(s) Oral two times a day with meals  pantoprazole    Tablet 40 milliGRAM(s) Oral before breakfast    MEDICATIONS  (PRN):  acetaminophen   Tablet .. 650 milliGRAM(s) Oral every 6 hours PRN Temp greater or equal to 38C (100.4F), Mild Pain (1 - 3)      Allergies    Bactrim (Anaphylaxis)  codine (Unknown)  contrast media (iodine-based) (Anaphylaxis)  mercury (Unknown)    Intolerances        SOCIAL HISTORY:    FAMILY HISTORY:  No pertinent family history in first degree relatives        Vital Signs Last 24 Hrs  T(C): 36.8 (04 Oct 2021 13:28), Max: 37.5 (03 Oct 2021 20:49)  T(F): 98.2 (04 Oct 2021 13:28), Max: 99.5 (03 Oct 2021 20:49)  HR: 98 (04 Oct 2021 13:28) (86 - 100)  BP: 101/65 (04 Oct 2021 13:28) (86/58 - 135/83)  BP(mean): --  RR: 18 (04 Oct 2021 13:28) (18 - 18)  SpO2: 92% (04 Oct 2021 13:28) (92% - 94%)    Physical Exam  General: A/ox 3, No acute Distress  Neck: Supple, NO JVD  Cardiac: S1 S2, I/VI systolic murmur  Pulmonary: Breathing unlabored, No Rhonchi/Rales/Wheezing, Diminished at bases bilaterally  Abdomen: Soft, Non -tender, +BS x 4 quads  Extremities: No Rashes, (+) BLE edema  Neuro: A/o x 3, No focal deficits    LABS:                        11.9   8.00  )-----------( 232      ( 04 Oct 2021 06:29 )             39.2     10-04    141  |  106  |  53<H>  ----------------------------<  109<H>  4.1   |  19<L>  |  1.60<H>    Ca    9.6      04 Oct 2021 06:29  Phos  3.4     10-04  Mg     1.6     10-04    TPro  7.0  /  Alb  3.8  /  TBili  0.6  /  DBili  x   /  AST  23  /  ALT  18  /  AlkPhos  149<H>  10-04          RADIOLOGY & ADDITIONAL STUDIES:  < from: TTE with Doppler (w/3D Echo) (Transthoracic Echocardiogram) (10.01.21 @ 14:41) >  Patient name: MATHEW LAMBERT  YOB: 1950   Age: 71 (F)   MR#: [id number]  Study Date: 10/1/2021  Location: [address] [place], [place] [id number]: [facility]  Study quality: Difficult; Patient scanned sup  Referring Physician: MD Gladys  Blood Pressure: 102/61 mmHg  Height: 163 cm  Weight: 99 kg  BSA: 2 m2  Heart Rate: 83 mmHg  ------------------------------------------------------------------------  PROCEDURE: Transthoracic echocardiogram with 2-D, M-Mode  and complete spectral and color flow Doppler. Real-time and  reconstructed 3-dimensional imaging was performed.  Color  Doppler analysis was carried out.  INDICATION: Dyspnea, unspecified (R06.00)  ------------------------------------------------------------------------  Dimensions:    Normal Values:  LA:     4.8    2.0 - 4.0 cm  Ao:     2.9    2.0 - 3.8 cm  SEPTUM: 1.6    0.6 - 1.2 cm  PWT:    1.3    0.6 - 1.1 cm  LVIDd:  2.9    3.0 - 5.6 cm  LVIDs:  1.7    1.8 - 4.0 cm  Derived variables:  LVMI: 74 g/m2  RWT:0.90  Fractional short: 41 %  EF (Escobar Rule): 77 %Doppler Peak Velocity (m/sec):  MV=2.2 AoV=2.6  ------------------------------------------------------------------------  Observations:  Mitral Valve: Mitral annular calcification and calcified  mitral leaflets with decreased diastolic opening. Mild  mitral regurgitation.  Peak mitral valve gradient equals 19  mm Hg, mean transmitral valve gradient equals 7 mm Hg,  consistent with moderate mitral stenosis.  Gradients  measured at a HR of 87bpm.  Aortic Valve/Aorta: Calcified aortic valve with decreased  opening. Peak transaortic valve gradient equals 28 mm Hg,  mean transaortic valve gradient equals 15 mm Hg, estimated  aortic valve area equals 1.3 sqcm (by continuity equation),  the DI is 0.46, aortic valve velocity time integral equals  43 cm, consistent with moderate aortic stenosis.  however on visual inspection the aortic valve stenosis  appears to be severe. Minimal aortic regurgitation.  Peak  left ventricular outflow tract gradient equals 5 mm Hg,  mean gradient is equal to 3 mm Hg, LVOT velocity time  integral equals 20 cm.  Aortic Root: 2.9 cm.  LVOT diameter: 2 cm.  Left Atrium: Severely dilated left atrium.  LA volume index  = 71 cc/m2.  Left Ventricle: Hyperdynamic leftventricular systolic  function. Mild concentric left ventricular hypertrophy with  proximal septal thickening. Moderate diastolic dysfunction  (Stage II).  Right Heart: Severe right atrial enlargement. Right  ventricular enlargement with decreased right ventricular  systolic function. Tethered tricuspid valve.  Moderate-severe tricuspid regurgitation. Normal pulmonic  valve. Minimal pulmonic regurgitation.  Pericardium/Pleura: Normal pericardium with trace  pericardial effusion.  Hemodynamic: Estimated right atrial pressure is 8 mm Hg.  Estimated right ventricular systolic pressure equals 111 mm  Hg, assuming right atrial pressure equals 8 mm Hg,  consistent with severe pulmonary hypertension.  ------------------------------------------------------------------------  Conclusions:  1. Mitral annular calcification and calcified mitral  leaflets with decreased diastolic opening. Mild mitral  regurgitation.  Peak mitral valve gradient equals 19 mm Hg,  mean transmitral valve gradient equals 7mm Hg, consistent  with moderate mitral stenosis.  Gradients measured at a HR  of 87bpm.  2. Calcified aortic valve with decreased opening. Peak  transaortic valve gradient equals 28 mm Hg, mean  transaortic valve gradient equals 15 mm Hg, estimated  aortic valve area equals 1.3 sqcm (by continuity equation),  the DI is 0.46, aortic valve velocity time integral equals  43 cm, consistent with moderate aortic stenosis.  however on visual inspection the aortic valve stenosis  appears to be severe. Minimal aortic regurgitation.  3. Hyperdynamic left ventricular systolic function.  4. Moderate diastolic dysfunction (Stage II).  5. Right ventricular enlargement with decreased right  ventricular systolic function.  *** No previous Echo exam.  ------------------------------------------------------------------------  Confirmed on  10/1/2021 - 16:41:32 by Dimas Mariano M.D.  ------------------------------------------------------------------------    < end of copied text >

## 2021-10-04 NOTE — PROGRESS NOTE ADULT - SUBJECTIVE AND OBJECTIVE BOX
[facility] Division of Hospital Medicine  Cinthya Ocampo MD  Pager (M-F, 8A-5P): [phone]  Other Times:  [phone]    Patient is a 71y old  Female who presents with a chief complaint of Progressive dyspnea (04 Oct 2021 16:43)      SUBJECTIVE / OVERNIGHT EVENTS: feeling ok. still SALMON but has not ambulated much.  hypotensive with sitting up. minimal symptoms.   LE edema unchanged    ADDITIONAL REVIEW OF SYSTEMS: otherwise neg    MEDICATIONS  (STANDING):  atorvastatin 20 milliGRAM(s) Oral at bedtime  enoxaparin Injectable 40 milliGRAM(s) SubCutaneous daily  ferrous    sulfate 325 milliGRAM(s) Oral daily  furosemide   Injectable 40 milliGRAM(s) IV Push daily  influenza   Vaccine 0.5 milliLiter(s) IntraMuscular once  magnesium oxide 400 milliGRAM(s) Oral two times a day with meals  pantoprazole    Tablet 40 milliGRAM(s) Oral before breakfast    MEDICATIONS  (PRN):  acetaminophen   Tablet .. 650 milliGRAM(s) Oral every 6 hours PRN Temp greater or equal to 38C (100.4F), Mild Pain (1 - 3)      CAPILLARY BLOOD GLUCOSE        I&O's Summary    03 Oct 2021 07:01  -  04 Oct 2021 07:00  --------------------------------------------------------  IN: 0 mL / OUT: 650 mL / NET: -650 mL    04 Oct 2021 07:01  -  04 Oct 2021 17:21  --------------------------------------------------------  IN: 600 mL / OUT: 400 mL / NET: 200 mL        PHYSICAL EXAM:  Vital Signs Last 24 Hrs  T(C): 36.8 (04 Oct 2021 13:28), Max: 37.5 (03 Oct 2021 20:49)  T(F): 98.2 (04 Oct 2021 13:28), Max: 99.5 (03 Oct 2021 20:49)  HR: 98 (04 Oct 2021 13:28) (86 - 100)  BP: 101/65 (04 Oct 2021 13:28) (86/58 - 135/83)  BP(mean): --  RR: 18 (04 Oct 2021 13:28) (18 - 18)  SpO2: 92% (04 Oct 2021 13:28) (92% - 94%)    CONSTITUTIONAL: NAD, well-groomed  EYES:  conjunctiva and sclera clear  ENMT: Moist oral mucosa  NECK: Supple, no palpable masses; no JVD  RESPIRATORY: Normal respiratory effort; lungs are clear to auscultation bilaterally  CARDIOVASCULAR: Regular rate and rhythm, +systolic murmur. 2+ LE edema  ABDOMEN: Nontender to palpation, normoactive bowel sounds, no rebound/guarding  MUSCULOSKELETAL:  no clubbing or cyanosis of digits; no joint swelling or tenderness to palpation  PSYCH: A+O to person, place, and time; affect appropriate  SKIN: No rashes; no palpable lesions    LABS:                        11.9   8.00  )-----------( 232      ( 04 Oct 2021 06:29 )             39.2     10-04    141  |  106  |  53<H>  ----------------------------<  109<H>  4.1   |  19<L>  |  1.60<H>    Ca    9.6      04 Oct 2021 06:29  Phos  3.4     10-04  Mg     1.6     10-04    TPro  7.0  /  Alb  3.8  /  TBili  0.6  /  DBili  x   /  AST  23  /  ALT  18  /  AlkPhos  149<H>  10-04                RADIOLOGY & ADDITIONAL TESTS:  Results Reviewed:   Imaging Personally Reviewed:  Electrocardiogram Personally Reviewed:    COORDINATION OF CARE:  Care Discussed with Consultants/Other Providers [Y/N]:  Prior or Outpatient Records Reviewed [Y/N]:

## 2021-10-04 NOTE — CONSULT NOTE ADULT - PROBLEM SELECTOR RECOMMENDATION 9
TTE reviewed with Dr. Rockwell, Mrs. Stout's AS and MS are reported as moderate.   No current clinical indication for transcatheter valve replacement.   Discussed case with Dr. Price who has reviewed outpt JONE. Given recurrent and progressive CHF symptoms, will assess candidacy for surgical intervention. CTS team notified of patients admission.     [id number]

## 2021-10-04 NOTE — CONSULT NOTE ADULT - ASSESSMENT
71 year old female, Zoroastrianism with PMH GAVE, childhood rheumatic fever, mitral and aortic stenosis, and recurrent CHF exacerbations, presented to the ED with three weeks of progressive exertional dyspnea and pedal edema. She was previously evaluated for valvular intervention at [facility], and sought an additional opinion from Dr. Price. The structural team was consulted for consideration of transcatheter intervention.

## 2021-10-04 NOTE — PROGRESS NOTE ADULT - ASSESSMENT
71F PMH GAVE, severe MV/AV disease c/b recently dx'd CHF p/w 2-week h/o progressively worsening dyspnea a/w significantly dec ET and LE swelling. On presentation, found to be in respiratory distress initially requiring BIPAP. Labs significant for elevated BNP([id number]). Admitted to medicine for decompensated heart failure likely 2/2 aortic vs mitral valvulopathy in s/o h/o rheumatic heart disease.

## 2021-10-04 NOTE — PROGRESS NOTE ADULT - SUBJECTIVE AND OBJECTIVE BOX
Patient seen and examined at bedside.    Overnight Events: NAEON    Review Of Systems: No chest pain, shortness of breath, or palpitations            Current Meds:  acetaminophen   Tablet .. 650 milliGRAM(s) Oral every 6 hours PRN  atorvastatin 20 milliGRAM(s) Oral at bedtime  enoxaparin Injectable 40 milliGRAM(s) SubCutaneous daily  ferrous    sulfate 325 milliGRAM(s) Oral daily  furosemide   Injectable 40 milliGRAM(s) IV Push daily  influenza   Vaccine 0.5 milliLiter(s) IntraMuscular once  magnesium oxide 400 milliGRAM(s) Oral two times a day with meals  pantoprazole    Tablet 40 milliGRAM(s) Oral before breakfast      Vitals:  T(F): 98.3 (10-04), Max: 99.5 (10-03)  HR: 86 (10-04) (83 - 100)  BP: 86/58 (10-04) (86/58 - 135/83)  RR: 18 (10-04)  SpO2: 92% (10-04)  I&O's Summary    03 Oct 2021 07:01  -  04 Oct 2021 07:00  --------------------------------------------------------  IN: 0 mL / OUT: 650 mL / NET: -650 mL    04 Oct 2021 07:01  -  04 Oct 2021 11:07  --------------------------------------------------------  IN: 360 mL / OUT: 0 mL / NET: 360 mL        Physical Exam:  GENERAL: No acute distress, well-developed  CHEST/LUNG: mild crackles in b/l bases   BACK: No spinal tenderness  HEART: Regular rate and rhythm; 3/6 systolic murmur, able to appreciate S2  ABDOMEN: Soft, Nontender, Nondistended; Bowel sounds present  EXTREMITIES: WWP, mild edema   PSYCH: Nl behavior, nl affect  NEUROLOGY: AAOx3, non-focal, cranial nerves intact  SKIN: Normal color, No rashes or lesions                          11.9   8.00  )-----------( 232      ( 04 Oct 2021 06:29 )             39.2     10-04    141  |  106  |  53<H>  ----------------------------<  109<H>  4.1   |  19<L>  |  1.60<H>    Ca    9.6      04 Oct 2021 06:29  Phos  3.4     10-04  Mg     1.6     10-04    TPro  7.0  /  Alb  3.8  /  TBili  0.6  /  DBili  x   /  AST  23  /  ALT  18  /  AlkPhos  149<H>  10-04      CARDIAC MARKERS ( 30 Sep 2021 20:42 )  19 ng/L / x     / x     / x     / x     / x      CARDIAC MARKERS ( 30 Sep 2021 16:43 )  20 ng/L / x     / x     / x     / x     / x          Serum Pro-Brain Natriuretic Peptide: 6580 pg/mL (10-03 @ 06:06)  Serum Pro-Brain Natriuretic Peptide: 06231 pg/mL (09-30 @ 16:43)          New ECG(s): Personally reviewed  Echo: Conclusions:  1. Mitral annular calcification and calcified mitral  leaflets with decreased diastolic opening. Mild mitral  regurgitation.  Peak mitral valve gradient equals 19 mm Hg,  mean transmitral valve gradient equals 7mm Hg, consistent  with moderate mitral stenosis.  Gradients measured at a HR  of 87bpm.  2. Calcified aortic valve with decreased opening. Peak  transaortic valve gradient equals 28 mm Hg, mean  transaortic valve gradient equals 15 mm Hg, estimated  aortic valve area equals 1.3 sqcm (by continuity equation),  the DI is 0.46, aortic valve velocity time integral equals  43 cm, consistent with moderate aortic stenosis.  however on visual inspection the aortic valve stenosis  appears to be severe. Minimal aortic regurgitation.  3. Hyperdynamic left ventricular systolic function.  4. Moderate diastolic dysfunction (Stage II).  5. Right ventricular enlargement with decreased right  ventricular systolic function.  *** No previous Echo exam.     Patient seen and examined at bedside.    Overnight Events: NAEON    Review Of Systems: No chest pain, shortness of breath, or palpitations            Current Meds:  acetaminophen   Tablet .. 650 milliGRAM(s) Oral every 6 hours PRN  atorvastatin 20 milliGRAM(s) Oral at bedtime  enoxaparin Injectable 40 milliGRAM(s) SubCutaneous daily  ferrous    sulfate 325 milliGRAM(s) Oral daily  furosemide   Injectable 40 milliGRAM(s) IV Push daily  influenza   Vaccine 0.5 milliLiter(s) IntraMuscular once  magnesium oxide 400 milliGRAM(s) Oral two times a day with meals  pantoprazole    Tablet 40 milliGRAM(s) Oral before breakfast    PMH:  Unchanged      Vitals:  T(F): 98.3 (10-04), Max: 99.5 (10-03)  HR: 86 (10-04) (83 - 100)  BP: 86/58 (10-04) (86/58 - 135/83)  RR: 18 (10-04)  SpO2: 92% (10-04)    Physical Exam:  GENERAL: No acute distress, well-developed  CHEST/LUNG: mild crackles in b/l bases   BACK: No spinal tenderness  HEART: Regular rate and rhythm; 3/6 systolic murmur, able to appreciate S2  ABDOMEN: Soft, Nontender, Nondistended; Bowel sounds present  EXTREMITIES: WWP, mild edema   PSYCH: Nl behavior, nl affect  NEUROLOGY: AAOx3, non-focal, cranial nerves intact  SKIN: Normal color, No rashes or lesions               LABS:             11.9   8.00  )-----------( 232      ( 04 Oct 2021 06:29 )             39.2     10-04  141  |  106  |  53<H>  ----------------------------<  109<H>  4.1   |  19<L>  |  1.60<H>    Ca    9.6      04 Oct 2021 06:29  Phos  3.4     10-04  Mg     1.6     10-04    TPro  7.0  /  Alb  3.8  /  TBili  0.6  /  DBili  x   /  AST  23  /  ALT  18  /  AlkPhos  149<H>  10-04    CARDIAC MARKERS ( 30 Sep 2021 20:42 )  19 ng/L / x     / x     / x     / x     / x      CARDIAC MARKERS ( 30 Sep 2021 16:43 )  20 ng/L / x     / x     / x     / x     / x        Serum Pro-Brain Natriuretic Peptide: 6580 pg/mL (10-03 @ 06:06)  Serum Pro-Brain Natriuretic Peptide: 77456 pg/mL (09-30 @ 16:43)        Echo: Conclusions:  1. Mitral annular calcification and calcified mitral leaflets with decreased diastolic opening. Mild mitral regurgitation.  Peak mitral valve gradient equals 19 mm Hg, mean transmitral valve gradient equals 7mm Hg, consistent with moderate mitral stenosis.  Gradients measured at a HR of 87bpm.  2. Calcified aortic valve with decreased opening. Peak transaortic valve gradient equals 28 mm Hg, mean transaortic valve gradient equals 15 mm Hg, estimated aortic valve area equals 1.3 sqcm (by continuity equation), the DI is 0.46, aortic valve velocity time integral equals 43 cm, consistent with moderate aortic stenosis. However, on visual inspection the aortic valve stenosis appears to be severe. Minimal aortic regurgitation.  3. Hyperdynamic left ventricular systolic function.  4. Moderate diastolic dysfunction (Stage II).  5. Right ventricular enlargement with decreased right ventricular systolic function.  *** No previous Echo exam.

## 2021-10-04 NOTE — CONSULT NOTE ADULT - ASSESSMENT
71 year old female, Hindu with PMH GAVE, childhood rheumatic fever, mitral and aortic stenosis, and recurrent CHF exacerbations, presented to the ED with three weeks of progressive exertional dyspnea and pedal edema. She was previously evaluated for valvular intervention at [facility], and sought an additional opinion from Dr. Price CT surgery. Also, structural team was consulted for consideration of transcatheter intervention.      CT surgery consulted for consideration of surgical mitral and aortic valve replacement insetting of symptomatic MS/AS.    recent TTE reviewed (10/1): AS and MS are reported as moderate. Appears severe AS on visual inspection  Dr. Price to review outpatient JONE  Will assess candidacy for surgical intervention  CTS to continue to follow  Continue to medically optimize 71 year old female, Confucianism with PMH GAVE, childhood rheumatic fever, mitral and aortic stenosis, and recurrent CHF exacerbations, presented to the ED with three weeks of progressive exertional dyspnea and pedal edema. She was previously evaluated for valvular intervention at [facility], and sought an additional opinion from Dr. Price CT surgery. Also, structural team was consulted for consideration of transcatheter intervention.      CT surgery consulted for consideration of surgical mitral and aortic valve replacement insetting of symptomatic MS/AS.    recent TTE reviewed (10/1): AS and MS are reported as moderate. Appears severe AS on visual inspection  Dr. Price to review outpatient JONE  Will assess candidacy for surgical intervention  CTS to continue to follow  Appreciate Cardiology recs  Would get preoperative GI evaluation/optimization given her hx of GAVE and risk for GI bleed   Continue to medically optimize 71 year old female, Yarsani with PMH GAVE, childhood rheumatic fever, mitral and aortic stenosis, and recurrent CHF exacerbations, presented to the ED with three weeks of progressive exertional dyspnea and pedal edema. She was previously evaluated for valvular intervention at [facility], and sought an additional opinion from Dr. Price CT surgery. Also, structural team was consulted for consideration of transcatheter intervention.      CT surgery consulted for consideration of surgical mitral and aortic valve replacement insetting of symptomatic MS/AS.    recent TTE reviewed (10/1): AS and MS are reported as moderate. Appears severe AS on visual inspection  Dr. Price to review outpatient JONE  Will assess candidacy for surgical intervention  CTS to continue to follow  Appreciate Cardiology recs  Would get preoperative GI evaluation/optimization given her hx of GAVE and risk for GI bleed   Would get dental consult preoperatively   Continue to medically optimize 71 year old female, Mandaen with PMH GAVE, childhood rheumatic fever, mitral and aortic stenosis, and recurrent CHF exacerbations, presented to the ED with three weeks of progressive exertional dyspnea and pedal edema. She was previously evaluated for valvular intervention at [facility], and sought an additional opinion from Dr. Price CT surgery. Also, structural team was consulted for consideration of transcatheter intervention.      CT surgery consulted for consideration of surgical mitral and aortic valve replacement insetting of symptomatic MS/AS.    recent TTE reviewed (10/1): AS and MS are reported as moderate. Appears severe AS on visual inspection  Dr. Price to review outpatient JONE  Will assess candidacy for surgical intervention (pt had cardiac cath outpatient)  CTS to continue to follow  Appreciate Cardiology recs  Would get preoperative GI evaluation/optimization given her hx of GAVE and risk for GI bleed   Would get dental consult preoperatively   Continue to medically optimize

## 2021-10-04 NOTE — CHART NOTE - NSCHARTNOTEFT_GEN_A_CORE
Notified by RN for PAT's on tele. Asymptomatic.    Vital Signs Last 24 Hrs  T(C): 37.2 (04 Oct 2021 06:20), Max: 37.5 (03 Oct 2021 20:49)  T(F): 98.9 (04 Oct 2021 06:20), Max: 99.5 (03 Oct 2021 20:49)  HR: 86 (04 Oct 2021 06:20) (81 - 100)  BP: 135/83 (04 Oct 2021 06:20) (97/61 - 135/83)  BP(mean): --  RR: 18 (04 Oct 2021 06:20) (18 - 18)  SpO2: 94% (04 Oct 2021 06:20) (92% - 95%)      Labs:                          11.1   8.52  )-----------( 235      ( 03 Oct 2021 06:07 )             35.5     10-03    140  |  107  |  54<H>  ----------------------------<  130<H>  3.9   |  18<L>  |  2.02<H>    Ca    9.3      03 Oct 2021 06:06  Phos  4.4     10-03  Mg     1.7     10-03              Assessment & Plan:  HPI:  71F PMH GAVE, severe MV/AV disease c/b recently dx'd CHF p/w 2-week h/o progressively worsening dyspnea. She reports significantly reduced ET to the point she can't even walk to the bathroom without feeling short of breath. She also has associated orthopnea and worsening leg swelling. She also endorses a cough productive of whitish phlegm. 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 She was told she would need replacement/repair of her valve(s)  however declined due to potential blood transfusion for blood loss during surgery as she is a Jehovah Witness. 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 resulting in anemia d/t blood loss that has required blood transfusions in the past.  Denied HA, lightheadedness, palpitations, CP, abdominal pain, melena/hematochezia, dietary indiscretion or medication non-adherence.    ED course: Lasix IV 40mg x1, MgSO4 1mg x2   (01 Oct 2021 00:03)    >  >  >  >    #     - Asymptomatic    - hemodynamically stable    - Baseline rhythm  with HR in *** on tele    - EKG shows no acute ST/T changes from previous ones    - Electrolytes stat    - Keep K > 3, Mg > 2, Phos > 3    - Will closely monitor on tele, clinical status, and vitals    - Will endorse to primary team in AM Notified by RN for PAT's  4.3 seconds on tele. AOx4. Asymptomatic. Patient states she has had shortness of breath for the last few weeks, but denies worsening of shortness of breath, lightheadedness, dizziness, chest pain, nausea, or vomiting.     Vital Signs Last 24 Hrs  T(C): 37.2 (04 Oct 2021 06:20), Max: 37.5 (03 Oct 2021 20:49)  T(F): 98.9 (04 Oct 2021 06:20), Max: 99.5 (03 Oct 2021 20:49)  HR: 86 (04 Oct 2021 06:20) (81 - 100)  BP: 135/83 (04 Oct 2021 06:20) (97/61 - 135/83)  BP(mean): --  RR: 18 (04 Oct 2021 06:20) (18 - 18)  SpO2: 94% (04 Oct 2021 06:20) (92% - 95%)      Labs:                          11.1   8.52  )-----------( 235      ( 03 Oct 2021 06:07 )             35.5     10-03    140  |  107  |  54<H>  ----------------------------<  130<H>  3.9   |  18<L>  |  2.02<H>    Ca    9.3      03 Oct 2021 06:06  Phos  4.4     10-03  Mg     1.7     10-03              Assessment & Plan:  HPI:  71F PMH GAVE, severe MV/AV disease c/b recently dx'd CHF p/w 2-week h/o progressively worsening dyspnea. She reports significantly reduced ET to the point she can't even walk to the bathroom without feeling short of breath. She also has associated orthopnea and worsening leg swelling. She also endorses a cough productive of whitish phlegm. 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 She was told she would need replacement/repair of her valve(s)  however declined due to potential blood transfusion for blood loss during surgery as she is a Jehovah Witness. 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 resulting in anemia d/t blood loss that has required blood transfusions in the past.  Denied HA, lightheadedness, palpitations, CP, abdominal pain, melena/hematochezia, dietary indiscretion or medication non-adherence.      # PAT's (4.3 secs)    - Asymptomatic    - hemodynamically stable    - Baseline rhythm  with HR in *** on tele    - EKG shows no acute ST/T changes from previous ones    - Electrolytes stat    - Keep K > 3, Mg > 2, Phos > 3    - Will closely monitor on tele, clinical status, and vitals    - Will endorse to primary team in AM    Kathi Rodriguez [facility] #[id number]  Dept of Medicine Notified by RN for PAT's  4.3 seconds on tele. AOx4. Asymptomatic. Patient states she has had shortness of breath for the last few weeks, but denies worsening of shortness of breath, lightheadedness, dizziness, chest pain, nausea, or vomiting.     Vital Signs Last 24 Hrs  T(C): 37.2 (04 Oct 2021 06:20), Max: 37.5 (03 Oct 2021 20:49)  T(F): 98.9 (04 Oct 2021 06:20), Max: 99.5 (03 Oct 2021 20:49)  HR: 86 (04 Oct 2021 06:20) (81 - 100)  BP: 135/83 (04 Oct 2021 06:20) (97/61 - 135/83)  BP(mean): --  RR: 18 (04 Oct 2021 06:20) (18 - 18)  SpO2: 94% (04 Oct 2021 06:20) (92% - 95%)      Labs:                          11.1   8.52  )-----------( 235      ( 03 Oct 2021 06:07 )             35.5     10-03    140  |  107  |  54<H>  ----------------------------<  130<H>  3.9   |  18<L>  |  2.02<H>    Ca    9.3      03 Oct 2021 06:06  Phos  4.4     10-03  Mg     1.7     10-03              Assessment & Plan:  HPI:  71F PMH GAVE, severe MV/AV disease c/b recently dx'd CHF p/w 2-week h/o progressively worsening dyspnea. She reports significantly reduced ET to the point she can't even walk to the bathroom without feeling short of breath. She also has associated orthopnea and worsening leg swelling. She also endorses a cough productive of whitish phlegm. 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 She was told she would need replacement/repair of her valve(s)  however declined due to potential blood transfusion for blood loss during surgery as she is a Jehovah Witness. 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 resulting in anemia d/t blood loss that has required blood transfusions in the past.      # PAT's (4.3 secs)    - Asymptomatic    - hemodynamically stable    - Baseline rhythm SR with HR in  on tele    - EKG shows no acute ST/T changes from previous ones    - Electrolytes stat    - Keep K > 3, Mg > 2, Phos > 3    - Will closely monitor on tele, clinical status, and vitals    - Will endorse to primary team in AM    Kathi Rodriguez [facility] #[id number]  Dept of Medicine Notified by RN for PAT's  4.3 seconds on tele. AOx4. Asymptomatic. Patient states she has had shortness of breath for the last few weeks, but denies worsening of shortness of breath,  lightheadedness, dizziness, chest pain, nausea, or vomiting.     Vital Signs Last 24 Hrs  T(C): 37.2 (04 Oct 2021 06:20), Max: 37.5 (03 Oct 2021 20:49)  T(F): 98.9 (04 Oct 2021 06:20), Max: 99.5 (03 Oct 2021 20:49)  HR: 86 (04 Oct 2021 06:20) (81 - 100)  BP: 135/83 (04 Oct 2021 06:20) (97/61 - 135/83)  BP(mean): --  RR: 18 (04 Oct 2021 06:20) (18 - 18)  SpO2: 94% (04 Oct 2021 06:20) (92% - 95%)      Labs:                          11.1   8.52  )-----------( 235      ( 03 Oct 2021 06:07 )             35.5     10-03    140  |  107  |  54<H>  ----------------------------<  130<H>  3.9   |  18<L>  |  2.02<H>    Ca    9.3      03 Oct 2021 06:06  Phos  4.4     10-03  Mg     1.7     10-03              Assessment & Plan:  HPI:  71F PMH GAVE, severe MV/AV disease c/b recently dx'd CHF p/w 2-week h/o progressively worsening dyspnea. She reports significantly reduced ET to the point she can't even walk to the bathroom without feeling short of breath. She also has associated orthopnea and worsening leg swelling. She also endorses a cough productive of whitish phlegm. 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 She was told she would need replacement/repair of her valve(s)  however declined due to potential blood transfusion for blood loss during surgery as she is a Jehovah Witness. 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 resulting in anemia d/t blood loss that has required blood transfusions in the past. Now evaluating patient for PAT's.       # PAT's (4.3 secs) 's    - Asymptomatic    - hemodynamically stable    - Baseline rhythm SR with HR in  on tele    - EKG     - Electrolytes stat    - Keep K > 3, Mg > 2, Phos > 3    - Will closely monitor on tele, clinical status, and vitals    - Will endorse to primary team in AM    Kathi Rodriguez [facility] #[id number]  Dept of Medicine Notified by RN for PAT's  4.3 seconds on tele. AOx4. Asymptomatic. Patient states she has had shortness of breath for the last few weeks, but denies worsening of shortness of breath,  lightheadedness, dizziness, chest pain, nausea, or vomiting.     Vital Signs Last 24 Hrs  T(C): 37.2 (04 Oct 2021 06:20), Max: 37.5 (03 Oct 2021 20:49)  T(F): 98.9 (04 Oct 2021 06:20), Max: 99.5 (03 Oct 2021 20:49)  HR: 86 (04 Oct 2021 06:20) (81 - 100)  BP: 135/83 (04 Oct 2021 06:20) (97/61 - 135/83)  BP(mean): --  RR: 18 (04 Oct 2021 06:20) (18 - 18)  SpO2: 94% (04 Oct 2021 06:20) (92% - 95%)      Labs:                          11.1   8.52  )-----------( 235      ( 03 Oct 2021 06:07 )             35.5     10-03    140  |  107  |  54<H>  ----------------------------<  130<H>  3.9   |  18<L>  |  2.02<H>    Ca    9.3      03 Oct 2021 06:06  Phos  4.4     10-03  Mg     1.7     10-03              Assessment & Plan:  HPI:  71F PMH GAVE, severe MV/AV disease c/b recently dx'd CHF p/w 2-week h/o progressively worsening dyspnea. She reports significantly reduced ET to the point she can't even walk to the bathroom without feeling short of breath. She also has associated orthopnea and worsening leg swelling. She also endorses a cough productive of whitish phlegm. 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 She was told she would need replacement/repair of her valve(s)  however declined due to potential blood transfusion for blood loss during surgery as she is a Jehovah Witness. 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 resulting in anemia d/t blood loss that has required blood transfusions in the past. Now evaluating patient for PAT's.       # PAT's (4.3 secs) 's    - Asymptomatic    - hemodynamically stable    - Baseline rhythm SR with HR in  on tele    - EKG     - Electrolytes stat    - Keep K > 3, Mg > 2, Phos > 3    - Cardiology following    - Will closely monitor on tele, clinical status, and vitals    - Will endorse to primary team in AM    Kathi Rodriguez [facility] #[id number]  Dept of Medicine

## 2021-10-05 NOTE — PHYSICAL THERAPY INITIAL EVALUATION ADULT - ADDITIONAL COMMENTS
lives w/ family in   [facility] lives w/ dtr and son in law in private home, 4 steps to enter w/ handrails and stair glide to her apt, JIGNESH L ear, R handed and reading glasses, pt reports independent w/ amb and ADL:"S pt has shower seat

## 2021-10-05 NOTE — PROGRESS NOTE ADULT - ASSESSMENT
71F PMH gastric antral vascular ectasia, mod MS / mod AS (rheumatic) and HFpEF.  Presented with 2 weeks of progressively worsening dyspnea, found to have borderline AS (moderate by gradients, severe by appearance).     REC:  - SOB may be in setting of increased filling pressures due to possibly severe AS, component of MS   - can continue gentle diuresis as written  - monitor lytes, K > 4 Mg > 2  - Strict I/Os, daily weights   - monitor on tele   - avoid large shifts in preload or afterload   - appreciate structural and CTS eval, no current indications for TAVR, however she is being considered for surgical intervention pending outpatient TTE review by CTS  - Cardiology will continue to follow       Angelita Prince M.D.  Cardiology fellow 71F PMH gastric antral vascular ectasia, mod MS / mod AS (rheumatic) and HFpEF.  Presented with 2 weeks of progressively worsening dyspnea, found to have borderline AS (moderate by gradients, severe by appearance).     REC:  - SOB may be in setting of increased filling pressures due to possibly severe AS, component of MS   - can continue gentle diuresis as written  - monitor lytes, K > 4 Mg > 2  - Strict I/Os, daily weights   - monitor on tele   - avoid large shifts in preload or afterload   - appreciate structural and CTS eval, no current indications for TAVR, however she is being considered for surgical intervention pending outpatient TTE review by CTS  - Cardiology will continue to follow       Angelita Prince M.D.  Cardiology fellow    Plan discussed with cardiology fellow; patient seen and examined.       I was physically present for the key portions of the evaluation and management (E/M) service provided.    I agree with the above history, physical, and plan which I have reviewed and edited where appropriate.   Leodan Almodovar M.D.  Cardiology Attending, Consult Service    For Cardiology consults and questions, all Cardiology service information can be found 24/7 on [email or web address], password: [email or web address]

## 2021-10-05 NOTE — PROVIDER CONTACT NOTE (OTHER) - ACTION/TREATMENT ORDERED:
Provider notified. Cardiac fellow at bedside. labs ordered by provider.  Will continue to monitor
Provider notified. Night RN and day RA tried x2 for IV, unsuccessful, IV team called for midline and IV access.  Holding IV lasix till access is established, Will continue to monitor
reschedule bp meds and lasix for 8am,will follow up

## 2021-10-05 NOTE — PROGRESS NOTE ADULT - SUBJECTIVE AND OBJECTIVE BOX
Patient seen and examined at bedside.    Overnight Events: NAEON    Review Of Systems: No chest pain, shortness of breath, or palpitations            Current Meds:  acetaminophen   Tablet .. 650 milliGRAM(s) Oral every 6 hours PRN  atorvastatin 20 milliGRAM(s) Oral at bedtime  enoxaparin Injectable 40 milliGRAM(s) SubCutaneous daily  ferrous    sulfate 325 milliGRAM(s) Oral daily  furosemide   Injectable 40 milliGRAM(s) IV Push daily  influenza   Vaccine 0.5 milliLiter(s) IntraMuscular once  magnesium oxide 400 milliGRAM(s) Oral two times a day with meals  pantoprazole    Tablet 40 milliGRAM(s) Oral before breakfast      Vitals:  T(F): 99.2 (10-05), Max: 99.2 (10-05)  HR: 99 (10-05) (86 - 99)  BP: 120/73 (10-05) (86/58 - 149/75)  RR: 18 (10-05)  SpO2: 95% (10-05)  I&O's Summary    04 Oct 2021 07:01  -  05 Oct 2021 07:00  --------------------------------------------------------  IN: 800 mL / OUT: 1100 mL / NET: -300 mL    05 Oct 2021 07:01  -  05 Oct 2021 10:06  --------------------------------------------------------  IN: 240 mL / OUT: 0 mL / NET: 240 mL        Physical Exam:  GENERAL: No acute distress, well-developed  CHEST/LUNG: mild crackles in b/l bases   BACK: No spinal tenderness  HEART: Regular rate and rhythm; 3/6 systolic murmur, able to appreciate S2  ABDOMEN: Soft, Nontender, Nondistended; Bowel sounds present  EXTREMITIES: WWP, mild edema   PSYCH: Nl behavior, nl affect  NEUROLOGY: AAOx3, non-focal, cranial nerves intact  SKIN: Normal color, No rashes or lesions                          11.8   9.23  )-----------( 244      ( 05 Oct 2021 07:00 )             38.2     10-05    143  |  105  |  50<H>  ----------------------------<  112<H>  4.3   |  19<L>  |  1.46<H>    Ca    9.7      05 Oct 2021 06:58  Phos  3.5     10-05  Mg     1.6     10-05    TPro  7.0  /  Alb  3.8  /  TBili  0.6  /  DBili  x   /  AST  23  /  ALT  18  /  AlkPhos  149<H>  10-04    PT/INR - ( 05 Oct 2021 07:01 )   PT: 13.8 sec;   INR: 1.16 ratio         PTT - ( 05 Oct 2021 07:01 )  PTT:32.5 sec  CARDIAC MARKERS ( 30 Sep 2021 20:42 )  19 ng/L / x     / x     / x     / x     / x      CARDIAC MARKERS ( 30 Sep 2021 16:43 )  20 ng/L / x     / x     / x     / x     / x          Serum Pro-Brain Natriuretic Peptide: 6580 pg/mL (10-03 @ 06:06)  Serum Pro-Brain Natriuretic Peptide: 04683 pg/mL (09-30 @ 16:43)          New ECG(s): Personally reviewed      Echo: Conclusions:  1. Mitral annular calcification and calcified mitral leaflets with decreased diastolic opening. Mild mitral regurgitation.  Peak mitral valve gradient equals 19 mm Hg, mean transmitral valve gradient equals 7mm Hg, consistent with moderate mitral stenosis.  Gradients measured at a HR of 87bpm.  2. Calcified aortic valve with decreased opening. Peak transaortic valve gradient equals 28 mm Hg, mean transaortic valve gradient equals 15 mm Hg, estimated aortic valve area equals 1.3 sqcm (by continuity equation), the DI is 0.46, aortic valve velocity time integral equals 43 cm, consistent with moderate aortic stenosis. However, on visual inspection the aortic valve stenosis appears to be severe. Minimal aortic regurgitation.  3. Hyperdynamic left ventricular systolic function.  4. Moderate diastolic dysfunction (Stage II).  5. Right ventricular enlargement with decreased right ventricular systolic function.  *** No previous Echo exam.   Patient seen and examined at bedside.    Overnight Events: JEANETTEEON    Review Of Systems: No chest pain, shortness of breath, or palpitations            Current Meds:  acetaminophen   Tablet .. 650 milliGRAM(s) Oral every 6 hours PRN  atorvastatin 20 milliGRAM(s) Oral at bedtime  enoxaparin Injectable 40 milliGRAM(s) SubCutaneous daily  ferrous    sulfate 325 milliGRAM(s) Oral daily  furosemide   Injectable 40 milliGRAM(s) IV Push daily  influenza   Vaccine 0.5 milliLiter(s) IntraMuscular once  magnesium oxide 400 milliGRAM(s) Oral two times a day with meals  pantoprazole    Tablet 40 milliGRAM(s) Oral before breakfast      Vitals:  T(F): 99.2 (10-05), Max: 99.2 (10-05)  HR: 99 (10-05) (86 - 99)  BP: 120/73 (10-05) (86/58 - 149/75)  RR: 18 (10-05)  SpO2: 95% (10-05)    Physical Exam:  GENERAL: No acute distress, well-developed  CHEST/LUNG: mild crackles in b/l bases   BACK: No spinal tenderness  HEART: Regular rate and rhythm; 3/6 systolic murmur, able to appreciate S2  ABDOMEN: Soft, Nontender, Nondistended; Bowel sounds present  EXTREMITIES: WWP, mild edema   PSYCH: Nl behavior, nl affect  NEUROLOGY: AAOx3, non-focal, cranial nerves intact  SKIN: Normal color, No rashes or lesions      I&O's Summary  04 Oct 2021 07:01  -  05 Oct 2021 07:00  --------------------------------------------------------  IN: 800 mL / OUT: 1100 mL / NET: -300 mL    05 Oct 2021 07:01  -  05 Oct 2021 10:06  --------------------------------------------------------  IN: 240 mL / OUT: 0 mL / NET: 240 mL      LABS:                     11.8   9.23  )-----------( 244      ( 05 Oct 2021 07:00 )             38.2     10-05  143  |  105  |  50<H>  ----------------------------<  112<H>  4.3   |  19<L>  |  1.46<H>    Ca    9.7      05 Oct 2021 06:58  Phos  3.5     10-05  Mg     1.6     10-05    TPro  7.0  /  Alb  3.8  /  TBili  0.6  /  DBili  x   /  AST  23  /  ALT  18  /  AlkPhos  149<H>  10-04    PT/INR - ( 05 Oct 2021 07:01 )   PT: 13.8 sec;   INR: 1.16 ratio    PTT - ( 05 Oct 2021 07:01 )  PTT:32.5 sec    CARDIAC MARKERS ( 30 Sep 2021 20:42 )  19 ng/L / x     / x     / x     / x     / x      CARDIAC MARKERS ( 30 Sep 2021 16:43 )  20 ng/L / x     / x     / x     / x     / x          Serum Pro-Brain Natriuretic Peptide: 6580 pg/mL (10-03 @ 06:06)  Serum Pro-Brain Natriuretic Peptide: 68301 pg/mL (09-30 @ 16:43)      Echo: Conclusions:  1. Mitral annular calcification and calcified mitral leaflets with decreased diastolic opening. Mild mitral regurgitation.  Peak mitral valve gradient equals 19 mm Hg, mean transmitral valve gradient equals 7mm Hg, consistent with moderate mitral stenosis.  Gradients measured at a HR of 87bpm.  2. Calcified aortic valve with decreased opening. Peak transaortic valve gradient equals 28 mm Hg, mean transaortic valve gradient equals 15 mm Hg, estimated aortic valve area equals 1.3 sqcm (by continuity equation), the DI is 0.46, aortic valve velocity time integral equals 43 cm, consistent with moderate aortic stenosis. However, on visual inspection the aortic valve stenosis appears to be severe. Minimal aortic regurgitation.  3. Hyperdynamic left ventricular systolic function.  4. Moderate diastolic dysfunction (Stage II).  5. Right ventricular enlargement with decreased right ventricular systolic function.  *** No previous Echo exam.

## 2021-10-05 NOTE — PROVIDER CONTACT NOTE (OTHER) - SITUATION
Pt noted with bp 90/57 hr 91
Tele event: PAT x 4.3 sec 
pt has no IV access, unable to give IV lasix

## 2021-10-05 NOTE — PHYSICAL THERAPY INITIAL EVALUATION ADULT - PERTINENT HX OF CURRENT PROBLEM, REHAB EVAL
72 y/o F admitted on 9/30/21 PMH gastric antral vascular ectasia, mod MS / mod AS (rheumatic) and HFpEF. s/p TTE

## 2021-10-05 NOTE — PROGRESS NOTE ADULT - ASSESSMENT
71F PMH GAVE, severe MV/AV disease c/b recently dx'd CHF p/w 2-week h/o progressively worsening dyspnea a/w significantly dec ET and LE swelling. On presentation, found to be in respiratory distress initially requiring BIPAP. Labs significant for elevated BNP(78591). Admitted to medicine for decompensated heart failure likely 2/2 aortic vs mitral valvulopathy in s/o h/o rheumatic heart disease.

## 2021-10-05 NOTE — PHYSICAL THERAPY INITIAL EVALUATION ADULT - ACTIVE RANGE OF MOTION EXAMINATION, REHAB EVAL
limited DF /PF 2/2 to edema B/L DF neutral PF 20-25/bilateral upper extremity Active ROM was WFL (within functional limits)/bilateral  lower extremity Active ROM was WFL (within functional limits)

## 2021-10-05 NOTE — PROGRESS NOTE ADULT - SUBJECTIVE AND OBJECTIVE BOX
[facility] Division of Hospital Medicine  Elidia Stafford MD  Pager (LALITO-F, 8A-5P): [phone]  Other Times:  [phone]    Patient is a 71y old  Female who presents with a chief complaint of Progressive dyspnea (05 Oct 2021 10:05)      SUBJECTIVE / OVERNIGHT EVENTS:    Patient was examined today. Reports that dyspnea is slightly better today, but still significant on exertion. 10 point ROS negative otherwise.     ADDITIONAL REVIEW OF SYSTEMS:    MEDICATIONS  (STANDING):  atorvastatin 20 milliGRAM(s) Oral at bedtime  enoxaparin Injectable 40 milliGRAM(s) SubCutaneous daily  ferrous    sulfate 325 milliGRAM(s) Oral daily  furosemide   Injectable 40 milliGRAM(s) IV Push daily  influenza   Vaccine 0.5 milliLiter(s) IntraMuscular once  magnesium oxide 400 milliGRAM(s) Oral two times a day with meals  pantoprazole    Tablet 40 milliGRAM(s) Oral before breakfast    MEDICATIONS  (PRN):  acetaminophen   Tablet .. 650 milliGRAM(s) Oral every 6 hours PRN Temp greater or equal to 38C (100.4F), Mild Pain (1 - 3)      CAPILLARY BLOOD GLUCOSE        I&O's Summary    04 Oct 2021 07:01  -  05 Oct 2021 07:00  --------------------------------------------------------  IN: 800 mL / OUT: 1100 mL / NET: -300 mL    05 Oct 2021 07:01  -  05 Oct 2021 16:45  --------------------------------------------------------  IN: 600 mL / OUT: 300 mL / NET: 300 mL        PHYSICAL EXAM:  Vital Signs Last 24 Hrs  T(C): 36.9 (05 Oct 2021 10:34), Max: 37.3 (05 Oct 2021 04:58)  T(F): 98.4 (05 Oct 2021 10:34), Max: 99.2 (05 Oct 2021 04:58)  HR: 72 (05 Oct 2021 12:53) (72 - 99)  BP: 129/66 (05 Oct 2021 12:53) (108/59 - 149/75)  BP(mean): --  RR: 18 (05 Oct 2021 12:53) (18 - 18)  SpO2: 92% (05 Oct 2021 12:53) (92% - 95%)      CONSTITUTIONAL: NAD, well-groomed  EYES:  conjunctiva and sclera clear  ENMT: Moist oral mucosa  NECK: Supple, no palpable masses; no JVD  RESPIRATORY: Normal respiratory effort; lungs are clear to auscultation bilaterally  CARDIOVASCULAR: Regular rate and rhythm, +systolic murmur. 2+ LE edema  ABDOMEN: Nontender to palpation, normoactive bowel sounds, no rebound/guarding  MUSCULOSKELETAL:  no clubbing or cyanosis of digits; no joint swelling or tenderness to palpation  PSYCH: A+O to person, place, and time; affect appropriate  SKIN: No rashes; no palpable lesions          LABS:                          11.8   9.23  )-----------( 244      ( 05 Oct 2021 07:00 )             38.2     10-05    143  |  105  |  50<H>  ----------------------------<  112<H>  4.3   |  19<L>  |  1.46<H>    Ca    9.7      05 Oct 2021 06:58  Phos  3.5     10-05  Mg     1.6     10-05    TPro  7.0  /  Alb  3.8  /  TBili  0.6  /  DBili  x   /  AST  23  /  ALT  18  /  AlkPhos  149<H>  10-04    PT/INR - ( 05 Oct 2021 07:01 )   PT: 13.8 sec;   INR: 1.16 ratio         PTT - ( 05 Oct 2021 07:01 )  PTT:32.5 sec      Urinalysis Basic - ( 05 Oct 2021 02:46 )    Color: Yellow / Appearance: Clear / S.023 / pH: x  Gluc: x / Ketone: Negative  / Bili: Negative / Urobili: Negative   Blood: x / Protein: Trace / Nitrite: Negative   Leuk Esterase: Negative / RBC: 4 /hpf / WBC 1 /HPF   Sq Epi: x / Non Sq Epi: 1 /hpf / Bacteria: Negative          RADIOLOGY & ADDITIONAL TESTS:  Results Reviewed:   Imaging Personally Reviewed:  Electrocardiogram Personally Reviewed:    COORDINATION OF CARE:  Care Discussed with Consultants/Other Providers [Y/N]:  Prior or Outpatient Records Reviewed [Y/N]:

## 2021-10-06 NOTE — CONSULT NOTE ADULT - SUBJECTIVE AND OBJECTIVE BOX
HPI:  71F PMH GAVE, severe MV/AV disease c/b recently dx'd CHF p/w 2-week h/o progressively worsening dyspnea. She reports significantly reduced ET to the point she can't even walk to the bathroom without feeling short of breath. She also has associated orthopnea and worsening leg swelling. She also endorses a cough productive of whitish phlegm. 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 She was told she would need replacement/repair of her valve(s)  however declined due to potential blood transfusion for blood loss during surgery as she is a [employer]. 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 resulting in anemia d/t blood loss that has required blood transfusions in the past.  Denied HA, lightheadedness, palpitations, CP, abdominal pain, melena/hematochezia, dietary indiscretion or medication non-adherence.    Patient is being evaluated for aortic and mitral valve replacement and GI consult was called for evaluation given history of GAVE before surgical intervention.      [facility] GI Dr. Perlman was called and records were obtained.  S/P EGD 2020 GAVE and   s/p Colonoscopy 2020 diverticulosis and hemorrhoids     Allergies:  Bactrim (Anaphylaxis)  codine (Unknown)  contrast media (iodine-based) (Anaphylaxis)  mercury (Unknown)    Home Medications:  none    Hospital Medications:  acetaminophen   Tablet .. 650 milliGRAM(s) Oral every 6 hours PRN  atorvastatin 20 milliGRAM(s) Oral at bedtime  enoxaparin Injectable 40 milliGRAM(s) SubCutaneous daily  ferrous    sulfate 325 milliGRAM(s) Oral daily  furosemide   Injectable 40 milliGRAM(s) IV Push daily  influenza   Vaccine 0.5 milliLiter(s) IntraMuscular once  magnesium oxide 400 milliGRAM(s) Oral two times a day with meals  pantoprazole    Tablet 40 milliGRAM(s) Oral before breakfast      PMHX/PSHX:      CHF due to valvular disease    HTN (hypertension)    Heart valve disease    H/O: rheumatic fever    HLD (hyperlipidemia)        Family history:  No pertinent family history in first degree relatives        Social History: no smoking    ROS:   General:  No fevers, chills or night sweats  ENT:  No sore throat or dysphagia  CV:  No pain or palpitations  Resp:  No dyspnea, cough or  wheezing  GI:  as above  Skin:  No rash or edema  Neuro: no weakness   Hematologic: no bleeding  Musculoskeletal: no muscle pain or join pain  Psych: no agitation     : no dysuria      PHYSICAL EXAM:   GENERAL:  NAD, Appears stated age  HEENT:  NC/AT,  conjunctivae clear and pink, sclera -anicteric  CHEST:  CTA B/L, Normal effort  HEART:  RRR S1/S2,  ABDOMEN:  Soft, non-tender, non-distended,  no masses   EXTREMITIES:  No cyanosis or Edema  SKIN:  Warm & Dry. No rash or erythema  NEURO:  Alert, oriented, no focal deficit    Vital Signs:  Vital Signs Last 24 Hrs  T(C): 36.5 (06 Oct 2021 10:40), Max: 37.7 (05 Oct 2021 20:18)  T(F): 97.7 (06 Oct 2021 10:40), Max: 99.9 (05 Oct 2021 20:18)  HR: 92 (06 Oct 2021 10:40) (92 - 112)  BP: 107/71 (06 Oct 2021 10:40) (107/71 - 133/79)  BP(mean): --  RR: 18 (06 Oct 2021 10:40) (18 - 18)  SpO2: 92% (06 Oct 2021 10:40) (91% - 93%)  Daily     Daily     LABS:                        11.9   9.31  )-----------( 233      ( 06 Oct 2021 06:40 )             38.1     Mean Cell Volume: 87.6 fl (10-06-21 @ 06:40)    10-06    143  |  105  |  48<H>  ----------------------------<  102<H>  4.2   |  20<L>  |  1.52<H>    Ca    9.7      06 Oct 2021 06:40  Phos  3.5     10-  Mg     1.8     10-06    TPro  7.4  /  Alb  3.9  /  TBili  0.8  /  DBili  x   /  AST  36  /  ALT  31  /  AlkPhos  184<H>  10-06    LIVER FUNCTIONS - ( 06 Oct 2021 06:40 )  Alb: 3.9 g/dL / Pro: 7.4 g/dL / ALK PHOS: 184 U/L / ALT: 31 U/L / AST: 36 U/L / GGT: x           PT/INR - ( 05 Oct 2021 07:01 )   PT: 13.8 sec;   INR: 1.16 ratio         PTT - ( 05 Oct 2021 07:01 )  PTT:32.5 sec  Urinalysis Basic - ( 05 Oct 2021 02:46 )    Color: Yellow / Appearance: Clear / S.023 / pH: x  Gluc: x / Ketone: Negative  / Bili: Negative / Urobili: Negative   Blood: x / Protein: Trace / Nitrite: Negative   Leuk Esterase: Negative / RBC: 4 /hpf / WBC 1 /HPF   Sq Epi: x / Non Sq Epi: 1 /hpf / Bacteria: Negative                              11.9   9.31  )-----------( 233      ( 06 Oct 2021 06:40 )             38.1                         11.8   9.23  )-----------( 244      ( 05 Oct 2021 07:00 )             38.2                         11.9   8.00  )-----------( 232      ( 04 Oct 2021 06:29 )             39.2     Imaging:           HPI:  71F PMH GAVE, severe MV/AV disease c/b recently dx'd CHF p/w 2-week h/o progressively worsening dyspnea. She reports significantly reduced ET to the point she can't even walk to the bathroom without feeling short of breath. She also has associated orthopnea and worsening leg swelling. She also endorses a cough productive of whitish phlegm. 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 She was told she would need replacement/repair of her valve(s)  however declined due to potential blood transfusion for blood loss during surgery as she is a [employer]. 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 resulting in anemia d/t blood loss that has required blood transfusions in the past.  Denied HA, lightheadedness, palpitations, CP, abdominal pain, melena/hematochezia, dietary indiscretion or medication non-adherence. Has had previous iron infusions before. Denies NSAID use. REports she has had therapy for GAVE in the setting of anemia 3 times, and all were with cautery (?APC).    Patient is being evaluated for aortic and mitral valve replacement and GI consult was called for evaluation given history of GAVE before surgical intervention.      [facility] GI Dr. Perlman was called and records were obtained.  S/P EGD 2020 GAVE and   s/p Colonoscopy 2020 diverticulosis and hemorrhoids     Allergies:  Bactrim (Anaphylaxis)  codine (Unknown)  contrast media (iodine-based) (Anaphylaxis)  mercury (Unknown)    Home Medications:  none    Hospital Medications:  acetaminophen   Tablet .. 650 milliGRAM(s) Oral every 6 hours PRN  atorvastatin 20 milliGRAM(s) Oral at bedtime  enoxaparin Injectable 40 milliGRAM(s) SubCutaneous daily  ferrous    sulfate 325 milliGRAM(s) Oral daily  furosemide   Injectable 40 milliGRAM(s) IV Push daily  influenza   Vaccine 0.5 milliLiter(s) IntraMuscular once  magnesium oxide 400 milliGRAM(s) Oral two times a day with meals  pantoprazole    Tablet 40 milliGRAM(s) Oral before breakfast      PMHX/PSHX:      CHF due to valvular disease    HTN (hypertension)    Heart valve disease    H/O: rheumatic fever    HLD (hyperlipidemia)        Family history:  No pertinent family history in first degree relatives        Social History: no smoking or illicit drugs    ROS:   General:  No fevers, chills or night sweats  ENT:  No sore throat or dysphagia  CV:  No pain or palpitations  Resp:  No dyspnea, cough or  wheezing  GI:  as above  Skin:  No rash or edema  Neuro: no weakness   Hematologic: no bleeding  Musculoskeletal: no muscle pain or join pain  Psych: no agitation     : no dysuria      PHYSICAL EXAM:   GENERAL:  NAD, Appears stated age  HEENT:  NC/AT,  conjunctivae clear and pink, sclera -anicteric  CHEST:  CTA B/L, Normal effort  HEART:  RRR S1/S2, +murmur  ABDOMEN:  Soft, non-tender, non-distended,  no masses   EXTREMITIES:  No cyanosis or Edema  SKIN:  Warm & Dry. No rash or erythema  NEURO:  Alert, oriented, no focal deficit  PSYCH: Normal affect    Vital Signs:  Vital Signs Last 24 Hrs  T(C): 36.5 (06 Oct 2021 10:40), Max: 37.7 (05 Oct 2021 20:18)  T(F): 97.7 (06 Oct 2021 10:40), Max: 99.9 (05 Oct 2021 20:18)  HR: 92 (06 Oct 2021 10:40) (92 - 112)  BP: 107/71 (06 Oct 2021 10:40) (107/71 - 133/79)  BP(mean): --  RR: 18 (06 Oct 2021 10:40) (18 - 18)  SpO2: 92% (06 Oct 2021 10:40) (91% - 93%)  Daily     Daily     LABS:                        11.9   9.31  )-----------( 233      ( 06 Oct 2021 06:40 )             38.1     Mean Cell Volume: 87.6 fl (10-06- @ 06:40)    10-    143  |  105  |  48<H>  ----------------------------<  102<H>  4.2   |  20<L>  |  1.52<H>    Ca    9.7      06 Oct 2021 06:40  Phos  3.5     10-  Mg     1.8     10-    TPro  7.4  /  Alb  3.9  /  TBili  0.8  /  DBili  x   /  AST  36  /  ALT  31  /  AlkPhos  184<H>  10-06    LIVER FUNCTIONS - ( 06 Oct 2021 06:40 )  Alb: 3.9 g/dL / Pro: 7.4 g/dL / ALK PHOS: 184 U/L / ALT: 31 U/L / AST: 36 U/L / GGT: x           PT/INR - ( 05 Oct 2021 07:01 )   PT: 13.8 sec;   INR: 1.16 ratio         PTT - ( 05 Oct 2021 07:01 )  PTT:32.5 sec  Urinalysis Basic - ( 05 Oct 2021 02:46 )    Color: Yellow / Appearance: Clear / S.023 / pH: x  Gluc: x / Ketone: Negative  / Bili: Negative / Urobili: Negative   Blood: x / Protein: Trace / Nitrite: Negative   Leuk Esterase: Negative / RBC: 4 /hpf / WBC 1 /HPF   Sq Epi: x / Non Sq Epi: 1 /hpf / Bacteria: Negative                              11.9   9.31  )-----------( 233      ( 06 Oct 2021 06:40 )             38.1                         11.8   9.23  )-----------( 244      ( 05 Oct 2021 07:00 )             38.2                         11.9   8.00  )-----------( 232      ( 04 Oct 2021 06:29 )             39.2

## 2021-10-06 NOTE — PROGRESS NOTE ADULT - SUBJECTIVE AND OBJECTIVE BOX
Patient seen and examined at bedside.    Overnight Events: NAEON    Review Of Systems: No chest pain, shortness of breath, or palpitations            Current Meds:  acetaminophen   Tablet .. 650 milliGRAM(s) Oral every 6 hours PRN  atorvastatin 20 milliGRAM(s) Oral at bedtime  enoxaparin Injectable 40 milliGRAM(s) SubCutaneous daily  ferrous    sulfate 325 milliGRAM(s) Oral daily  furosemide   Injectable 40 milliGRAM(s) IV Push daily  influenza   Vaccine 0.5 milliLiter(s) IntraMuscular once  magnesium oxide 400 milliGRAM(s) Oral two times a day with meals  pantoprazole    Tablet 40 milliGRAM(s) Oral before breakfast      Vitals:  T(F): 99 (10-06), Max: 99.9 (10-05)  HR: 92 (10-06) (72 - 112)  BP: 113/71 (10-06) (108/59 - 133/79)  RR: 18 (10-06)  SpO2: 93% (10-06)  I&O's Summary    05 Oct 2021 07:01  -  06 Oct 2021 07:00  --------------------------------------------------------  IN: 600 mL / OUT: 1150 mL / NET: -550 mL        Physical Exam:  GENERAL: No acute distress, well-developed  CHEST/LUNG: mild crackles in b/l bases   BACK: No spinal tenderness  HEART: Regular rate and rhythm; 3/6 systolic murmur, able to appreciate S2  ABDOMEN: Soft, Nontender, Nondistended; Bowel sounds present  EXTREMITIES: WWP, mild edema   PSYCH: Nl behavior, nl affect  NEUROLOGY: AAOx3, non-focal, cranial nerves intact  SKIN: Normal color, No rashes or lesions                          11.9   9.31  )-----------( 233      ( 06 Oct 2021 06:40 )             38.1     10-06    143  |  105  |  48<H>  ----------------------------<  102<H>  4.2   |  20<L>  |  1.52<H>    Ca    9.7      06 Oct 2021 06:40  Phos  3.5     10-05  Mg     1.8     10-06    TPro  7.4  /  Alb  3.9  /  TBili  0.8  /  DBili  x   /  AST  36  /  ALT  31  /  AlkPhos  184<H>  10-06    PT/INR - ( 05 Oct 2021 07:01 )   PT: 13.8 sec;   INR: 1.16 ratio         PTT - ( 05 Oct 2021 07:01 )  PTT:32.5 sec  CARDIAC MARKERS ( 30 Sep 2021 20:42 )  19 ng/L / x     / x     / x     / x     / x      CARDIAC MARKERS ( 30 Sep 2021 16:43 )  20 ng/L / x     / x     / x     / x     / x          Serum Pro-Brain Natriuretic Peptide: 6580 pg/mL (10-03 @ 06:06)  Serum Pro-Brain Natriuretic Peptide: 66938 pg/mL (09-30 @ 16:43)          New ECG(s): Personally reviewed    Echo: Conclusions:  1. Mitral annular calcification and calcified mitral leaflets with decreased diastolic opening. Mild mitral regurgitation.  Peak mitral valve gradient equals 19 mm Hg, mean transmitral valve gradient equals 7mm Hg, consistent with moderate mitral stenosis.  Gradients measured at a HR of 87bpm.  2. Calcified aortic valve with decreased opening. Peak transaortic valve gradient equals 28 mm Hg, mean transaortic valve gradient equals 15 mm Hg, estimated aortic valve area equals 1.3 sqcm (by continuity equation), the DI is 0.46, aortic valve velocity time integral equals 43 cm, consistent with moderate aortic stenosis. However, on visual inspection the aortic valve stenosis appears to be severe. Minimal aortic regurgitation.  3. Hyperdynamic left ventricular systolic function.  4. Moderate diastolic dysfunction (Stage II).  5. Right ventricular enlargement with decreased right ventricular systolic function.  *** No previous Echo exam.   Patient seen and examined at bedside.    Overnight Events: NAEON    Review Of Systems: No chest pain, shortness of breath, or palpitations            Current Meds:  acetaminophen   Tablet .. 650 milliGRAM(s) Oral every 6 hours PRN  atorvastatin 20 milliGRAM(s) Oral at bedtime  enoxaparin Injectable 40 milliGRAM(s) SubCutaneous daily  ferrous    sulfate 325 milliGRAM(s) Oral daily  furosemide   Injectable 40 milliGRAM(s) IV Push daily  influenza   Vaccine 0.5 milliLiter(s) IntraMuscular once  magnesium oxide 400 milliGRAM(s) Oral two times a day with meals  pantoprazole    Tablet 40 milliGRAM(s) Oral before breakfast      Vitals:  T(F): 99 (10-06), Max: 99.9 (10-05)  HR: 92 (10-06) (72 - 112)  BP: 113/71 (10-06) (108/59 - 133/79)  RR: 18 (10-06)  SpO2: 93% (10-06)    Physical Exam:  GENERAL: No acute distress, well-developed  CHEST/LUNG: mild crackles in b/l bases   BACK: No spinal tenderness  HEART: Regular rate and rhythm; 3/6 systolic murmur, able to appreciate S2  ABDOMEN: Soft, Nontender, Nondistended; Bowel sounds present  EXTREMITIES: WWP, mild edema   PSYCH: Nl behavior, nl affect  NEUROLOGY: AAOx3, non-focal, cranial nerves intact  SKIN: Normal color, No rashes or lesions      I&O's Summary    05 Oct 2021 07:01  -  06 Oct 2021 07:00  --------------------------------------------------------  IN: 600 mL / OUT: 1150 mL / NET: -550 mL      LABS:                    11.9   9.31  )-----------( 233      ( 06 Oct 2021 06:40 )             38.1     10-06  143  |  105  |  48<H>  ----------------------------<  102<H>  4.2   |  20<L>  |  1.52<H>    Ca    9.7      06 Oct 2021 06:40  Phos  3.5     10-05  Mg     1.8     10-06    TPro  7.4  /  Alb  3.9  /  TBili  0.8  /  DBili  x   /  AST  36  /  ALT  31  /  AlkPhos  184<H>  10-06    PT/INR - ( 05 Oct 2021 07:01 )   PT: 13.8 sec;   INR: 1.16 ratio    PTT - ( 05 Oct 2021 07:01 )  PTT:32.5 sec    CARDIAC MARKERS ( 30 Sep 2021 20:42 )  19 ng/L / x     / x     / x     / x     / x      CARDIAC MARKERS ( 30 Sep 2021 16:43 )  20 ng/L / x     / x     / x     / x     / x        Serum Pro-Brain Natriuretic Peptide: 6580 pg/mL (10-03 @ 06:06)  Serum Pro-Brain Natriuretic Peptide: 72905 pg/mL (09-30 @ 16:43)    Echo: Conclusions:  1. Mitral annular calcification and calcified mitral leaflets with decreased diastolic opening. Mild mitral regurgitation.  Peak mitral valve gradient equals 19 mm Hg, mean transmitral valve gradient equals 7mm Hg, consistent with moderate mitral stenosis.  Gradients measured at a HR of 87bpm.  2. Calcified aortic valve with decreased opening. Peak transaortic valve gradient equals 28 mm Hg, mean transaortic valve gradient equals 15 mm Hg, estimated aortic valve area equals 1.3 sqcm (by continuity equation), the DI is 0.46, aortic valve velocity time integral equals 43 cm, consistent with moderate aortic stenosis. However, on visual inspection the aortic valve stenosis appears to be severe. Minimal aortic regurgitation.  3. Hyperdynamic left ventricular systolic function.  4. Moderate diastolic dysfunction (Stage II).  5. Right ventricular enlargement with decreased right ventricular systolic function.  *** No previous Echo exam.

## 2021-10-06 NOTE — PROGRESS NOTE ADULT - ASSESSMENT
71F PMH GAVE, severe MV/AV disease c/b recently dx'd CHF p/w 2-week h/o progressively worsening dyspnea a/w significantly dec ET and LE swelling. On presentation, found to be in respiratory distress initially requiring BIPAP. Labs significant for elevated BNP(96040). Admitted to medicine for decompensated heart failure likely 2/2 aortic vs mitral valvulopathy in s/o h/o rheumatic heart disease.

## 2021-10-06 NOTE — CONSULT NOTE ADULT - SUBJECTIVE AND OBJECTIVE BOX
Patient is a 71y old  Female who presents with a chief complaint of Progressive dyspnea (06 Oct 2021 14:51)      HPI:  71F PMH GAVE, severe MV/AV disease c/b recently dx'd CHF p/w 2-week h/o progressively worsening dyspnea. She reports significantly reduced ET to the point she can't even walk to the bathroom without feeling short of breath. She also has associated orthopnea and worsening leg swelling. She also endorses a cough productive of whitish phlegm. 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 She was told she would need replacement/repair of her valve(s)  however declined due to potential blood transfusion for blood loss during surgery as she is a Jehovah Witness. 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 resulting in anemia d/t blood loss that has required blood transfusions in the past.  Denied HA, lightheadedness, palpitations, CP, abdominal pain, melena/hematochezia, dietary indiscretion or medication non-adherence.    ED course: Lasix IV 40mg x1, MgSO4 1mg x2   (01 Oct 2021 00:03)      PAST MEDICAL & SURGICAL HISTORY:  CHF due to valvular disease    HTN (hypertension)    Heart valve disease    H/O: rheumatic fever    HLD (hyperlipidemia)      (   ) heart valve replacement  (   ) joint replacement  (   ) pregnancy    MEDICATIONS  (STANDING):  atorvastatin 20 milliGRAM(s) Oral at bedtime  enoxaparin Injectable 40 milliGRAM(s) SubCutaneous daily  ferrous    sulfate 325 milliGRAM(s) Oral daily  furosemide   Injectable 40 milliGRAM(s) IV Push daily  influenza   Vaccine 0.5 milliLiter(s) IntraMuscular once  magnesium oxide 400 milliGRAM(s) Oral two times a day with meals  pantoprazole    Tablet 40 milliGRAM(s) Oral before breakfast    MEDICATIONS  (PRN):  acetaminophen   Tablet .. 650 milliGRAM(s) Oral every 6 hours PRN Temp greater or equal to 38C (100.4F), Mild Pain (1 - 3)      Allergies    Bactrim (Anaphylaxis)  codine (Unknown)  contrast media (iodine-based) (Anaphylaxis)  mercury (Unknown)    Intolerances        FAMILY HISTORY:  No pertinent family history in first degree relatives        *SOCIAL HISTORY: (guardian or who pt came with), (smoking hx)    *Last Dental Visit:    Vital Signs Last 24 Hrs  T(C): 36.5 (06 Oct 2021 10:40), Max: 37.7 (05 Oct 2021 20:18)  T(F): 97.7 (06 Oct 2021 10:40), Max: 99.9 (05 Oct 2021 20:18)  HR: 92 (06 Oct 2021 10:40) (92 - 112)  BP: 107/71 (06 Oct 2021 10:40) (107/71 - 133/79)  BP(mean): --  RR: 18 (06 Oct 2021 10:40) (18 - 18)  SpO2: 92% (06 Oct 2021 10:40) (91% - 93%)    EOE:  TMJ (-) clicks                    (-) pops                    (-) crepitus             Mandible FROM             Facial bones and MOM grossly intact             (-) trismus             (-) LAD             (-) swelling             (   ) asymmetry             (-) palpation             (-) SOB             (-) dysphagia             (-) LOC    IOE:  Completely edentulous maxilla, partially edentulous mandible with gross caries observed on #22/23. Slight tenderness to percussion/palpation of buccal vestibule, but no swelling/fluctuance observed. Patient had recently put in her fixodent for the maxillary complete denture -- was unable to remove maxillary CD without her being in discomfort.           hard/soft palate:  (-)  palatal torus           tongue/FOM WNL           labial/buccal mucosa WNL    Radiographs: None obtained on bedside exam.               11.9   9.31  )-----------( 233      ( 06 Oct 2021 06:40 )             38.1     10-06    143  |  105  |  48<H>  ----------------------------<  102<H>  4.2   |  20<L>  |  1.52<H>    Ca    9.7      06 Oct 2021 06:40  Phos  3.5     10-05  Mg     1.8     10-06    TPro  7.4  /  Alb  3.9  /  TBili  0.8  /  DBili  x   /  AST  36  /  ALT  31  /  AlkPhos  184<H>  10-06    WBC Count: 9.31 K/uL [3.80 - 10.50] (10-06 @ 06:40)    Platelet Count - Automated: 233 K/uL [150 - 400] (10-06 @ 06:40)  Platelet Count - Automated: 244 K/uL [150 - 400] (10-05 @ 07:00)    INR: 1.16 ratio [0.88 - 1.16] (10-05 @ 07:01)      Urinalysis Basic - ( 05 Oct 2021 02:46 )    Color: Yellow / Appearance: Clear / S.023 / pH: x  Gluc: x / Ketone: Negative  / Bili: Negative / Urobili: Negative   Blood: x / Protein: Trace / Nitrite: Negative   Leuk Esterase: Negative / RBC: 4 /hpf / WBC 1 /HPF   Sq Epi: x / Non Sq Epi: 1 /hpf / Bacteria: Negative    ASSESSMENT: Dental consulted to rule out odontogenic infection prior to cardiac procedure. No clinical signs of acute infection; however, panoramic image necessary to assess patient for clearance. Patient declined the panoramic today because she was too tired, but consented to having it done tomorrow afternoon/early evening. Plan to have dental on call resident tomorrow (10/7) take panoramic image to evaluate for odontogenic infection.      RECOMMENDATIONS:   1) [facility] Dental F/U tomorrow for radiographic imaging to evaluate for odontogenic infection.   2) Dental F/U with outpatient dentist for comprehensive dental care.   3) If any difficulty swallowing/breathing, fever occur, page dental.     Artur Petersen, DMD #[id number]  Oral surgeon consulted: Dr. Graves

## 2021-10-06 NOTE — PROGRESS NOTE ADULT - ASSESSMENT
71F PMH gastric antral vascular ectasia, mod MS / mod AS (rheumatic) and HFpEF.  Presented with 2 weeks of progressively worsening dyspnea, found to have borderline AS (moderate by gradients, severe by appearance).     REC:  - SOB may be in setting of increased filling pressures due to possibly severe AS, component of MS   - can continue gentle diuresis as written  - monitor lytes, K > 4 Mg > 2  - Strict I/Os, daily weights   - monitor on tele   - avoid large shifts in preload or afterload   - appreciate structural and CTS eval, no current indications for TAVR, however she is being considered for surgical intervention pending outpatient TTE review by CTS  - Cardiology will continue to follow       Angelita Prince M.D.  Cardiology fellow   71F PMH gastric antral vascular ectasia, mod MS / mod AS (rheumatic) and HFpEF.  Presented with 2 weeks of progressively worsening dyspnea, found to have borderline AS (moderate by gradients, severe by appearance).     REC:  - SOB may be in setting of increased filling pressures due to possibly severe AS, component of MS   - can continue gentle diuresis as written  - monitor lytes, K > 4 Mg > 2  - Strict I/Os, daily weights   - monitor on tele   - avoid large shifts in preload or afterload   - appreciate structural and CTS eval, no current indications for TAVR, however she is being considered for surgical intervention pending outpatient TTE review by CTS  - Cardiology will continue to follow     Angelita Prince M.D.  Cardiology fellow    Plan discussed with cardiology fellow; patient seen and examined.       I was physically present for the key portions of the evaluation and management (E/M) service provided.    I agree with the above history, physical, and plan which I have reviewed and edited where appropriate.   Leodan Almodovar M.D.  Cardiology Attending, Consult Service    For Cardiology consults and questions, all Cardiology service information can be found 24/7 on [email or web address], password: [email or web address]

## 2021-10-06 NOTE — PROGRESS NOTE ADULT - SUBJECTIVE AND OBJECTIVE BOX
[facility] Division of Hospital Medicine  Cinthya Ocampo MD  Pager (M-F, 3A-5P): [phone]  Other Times:  [phone]    Patient is a 71y old  Female who presents with a chief complaint of Progressive dyspnea (06 Oct 2021 13:07)      SUBJECTIVE / OVERNIGHT EVENTS: afebrile. feeling ok. c/o left foot pain around 1st toe.   denies any chest pain or SOB. afebrile.     ADDITIONAL REVIEW OF SYSTEMS: otherwise neg    MEDICATIONS  (STANDING):  atorvastatin 20 milliGRAM(s) Oral at bedtime  enoxaparin Injectable 40 milliGRAM(s) SubCutaneous daily  ferrous    sulfate 325 milliGRAM(s) Oral daily  furosemide   Injectable 40 milliGRAM(s) IV Push daily  influenza   Vaccine 0.5 milliLiter(s) IntraMuscular once  magnesium oxide 400 milliGRAM(s) Oral two times a day with meals  pantoprazole    Tablet 40 milliGRAM(s) Oral before breakfast    MEDICATIONS  (PRN):  acetaminophen   Tablet .. 650 milliGRAM(s) Oral every 6 hours PRN Temp greater or equal to 38C (100.4F), Mild Pain (1 - 3)      CAPILLARY BLOOD GLUCOSE        I&O's Summary    05 Oct 2021 07:01  -  06 Oct 2021 07:00  --------------------------------------------------------  IN: 600 mL / OUT: 1150 mL / NET: -550 mL    06 Oct 2021 07:01  -  06 Oct 2021 14:51  --------------------------------------------------------  IN: 600 mL / OUT: 500 mL / NET: 100 mL        PHYSICAL EXAM:  Vital Signs Last 24 Hrs  T(C): 36.5 (06 Oct 2021 10:40), Max: 37.7 (05 Oct 2021 20:18)  T(F): 97.7 (06 Oct 2021 10:40), Max: 99.9 (05 Oct 2021 20:18)  HR: 92 (06 Oct 2021 10:40) (92 - 112)  BP: 107/71 (06 Oct 2021 10:40) (107/71 - 133/79)  BP(mean): --  RR: 18 (06 Oct 2021 10:40) (18 - 18)  SpO2: 92% (06 Oct 2021 10:40) (91% - 93%)    CONSTITUTIONAL: NAD, well-groomed  EYES:  conjunctiva and sclera clear  ENMT: Moist oral mucosa  NECK: Supple, no palpable masses; no JVD  RESPIRATORY: Normal respiratory effort; lungs are clear to auscultation bilaterally  CARDIOVASCULAR: Regular rate and rhythm, +systolic murmur. 2+ LE edema  ABDOMEN: Nontender to palpation, normoactive bowel sounds, no rebound/guarding  MUSCULOSKELETAL: left 1st toe pain and edema. tenderness  PSYCH: A+O to person, place, and time; affect appropriate  SKIN: No rashes; no palpable lesions    LABS:                        11.9   9.31  )-----------( 233      ( 06 Oct 2021 06:40 )             38.1     10-    143  |  105  |  48<H>  ----------------------------<  102<H>  4.2   |  20<L>  |  1.52<H>    Ca    9.7      06 Oct 2021 06:40  Phos  3.5     10-05  Mg     1.8     10-    TPro  7.4  /  Alb  3.9  /  TBili  0.8  /  DBili  x   /  AST  36  /  ALT  31  /  AlkPhos  184<H>  10-06    PT/INR - ( 05 Oct 2021 07:01 )   PT: 13.8 sec;   INR: 1.16 ratio         PTT - ( 05 Oct 2021 07:01 )  PTT:32.5 sec      Urinalysis Basic - ( 05 Oct 2021 02:46 )    Color: Yellow / Appearance: Clear / S.023 / pH: x  Gluc: x / Ketone: Negative  / Bili: Negative / Urobili: Negative   Blood: x / Protein: Trace / Nitrite: Negative   Leuk Esterase: Negative / RBC: 4 /hpf / WBC 1 /HPF   Sq Epi: x / Non Sq Epi: 1 /hpf / Bacteria: Negative          RADIOLOGY & ADDITIONAL TESTS:  Results Reviewed:   Imaging Personally Reviewed:  Electrocardiogram Personally Reviewed:    COORDINATION OF CARE:  Care Discussed with Consultants/Other Providers [Y/N]:  Prior or Outpatient Records Reviewed [Y/N]:

## 2021-10-06 NOTE — CONSULT NOTE ADULT - ATTENDING COMMENTS
Dyspnea is secondary to mixed valvular disease with MS/AS and pulmonary HTN (resulting in RV dilation).  The patient also has gastric antral vascular ectasia and is a Jehovah Witness.  Her symptoms appear improved with diuresis.  Recommend keeping outs = ins with close monitoring of renal function, BP and overall perfusion.  Will discuss her case with CTS.
Patient seen and examined, agree with above. Reviewed outpatient records - she had cautery therapy for GAVE x 3, last >1 year ago. No current signs of bleeding, no melena/hematochezia, H/H stable. Patient is pending CT surgery for valvular repair. Discussed with patient that there is no clear evidence for prophylactic treatment of GAVE, that there is always small risk of bleeding/infection/perforation with EGD. Explained that if she had cautery x 3 and still has GAVE, she really should consider an alternative therapy such as Cryotherapy or banding for more definitive and long term treatment. She does not want to try the other options as they are newer and wants only EGD/cautery, and understands the risks.    If no objection from cardiac standpoint, will plan for EGD tomorrow.
Signs/symptoms of led to the patient's current hospitalization were reviewed.  The patient's past medical history/past surgical history/family history/social history was gone over.  Agree with physical nation as noted above.    The patient has moderate mitral valve stenosis, moderate to severe tricuspid regurgitation and moderate aortic valve stenosis.  Also noted to have severe pulmonary hypertension with RVSP of 111 mmHg with right ventricular enlargement with decreased right ventricular systolic function.    Due to the patient's severe mixed valvular disease it is recommended that the patient be further evaluated by the cardiac surgery team/Dr. Price.  The patient approximately 6 weeks ago underwent a complete evaluation at [facility] during which time she reports had a JONE, cardiac catheterization and CT scanning was performed.  Cardiac surgical office will try to obtain report/images of these studies.    Discussion was had with the patient discussing her severe mixed valvular disease.  The associated signs/symptoms and the natural pathophysiology of untreated severe mixed valvular disease was explained.  It was reviewed why percutaneous intervention is not recommended at this time.    All questions and concerns of the patient were addressed.    Findings and plan were discussed with cardiology/Dr. Almodovar, cardiac surgery/Dr. Price and structural heart team.

## 2021-10-06 NOTE — CONSULT NOTE ADULT - ASSESSMENT
Impression:  # H/o GAVE required blood transfusion in the past s/p EGD with APC on 09/2020 now with normal HGB    Recommendations:    Parul Vicente MD  Gastroenterology Fellow  Pager: [phone]  Please call answering service [phone] / on-call GI fellow after 5pm and before 8am, and on weekends. Impression:  # H/o GAVE required blood transfusion in the past s/p EGD with APC on 09/2020 now with normal HGB    Recommendations:  - Keep NPO after midnight  - Plan for EGD tomorrow     Parul Vicente MD  Gastroenterology Fellow  Pager: [phone]  Please call answering service [phone] / on-call GI fellow after 5pm and before 8am, and on weekends. Impression:  # H/o GAVE required blood transfusion in the past s/p EGD with APC on 09/2020 now with normal HGB  # AS pending repair    Recommendations:  - Keep NPO after midnight  - If no objection from cardiac standpoint, will plan for EGD tomorrow   - Risks/benefits of EGD with GAVE cautery explained, she is agreeable to proceed    Parul Vicente MD  Gastroenterology Fellow  Pager: [phone]  Please call answering service [phone] / on-call GI fellow after 5pm and before 8am, and on weekends.

## 2021-10-07 NOTE — PROGRESS NOTE ADULT - ASSESSMENT
71F PMH GAVE, severe MV/AV disease c/b recently dx'd CHF p/w 2-week h/o progressively worsening dyspnea a/w significantly dec ET and LE swelling. On presentation, found to be in respiratory distress initially requiring BIPAP. Labs significant for elevated BNP(11511). Admitted to medicine for decompensated heart failure likely 2/2 aortic vs mitral valvulopathy in s/o h/o rheumatic heart disease.

## 2021-10-07 NOTE — CHART NOTE - NSCHARTNOTEFT_GEN_A_CORE
Patient requested EGD to be done tomorrow - for her family to be around her  Please obtain cardiology clearance for EGD tomorrow Patient requested EGD to be done tomorrow - for her family to be around her  Please obtain cardiology clearance for EGD tomorrow  NPO after midnight

## 2021-10-07 NOTE — PROGRESS NOTE ADULT - SUBJECTIVE AND OBJECTIVE BOX
Please refer to previous dental consult note from 10/6/21 for additional information.     CLINICAL EXAM: same as consult note, no changes     DENTAL RADIOGRAPHS: none. Due to high BMI and left foot gout, pt was not able to get out of bed into wheelchair to get to dental room to take Panoramic radiograph. Attempted many times, until pt gave up.     RECOMMENDATIONS:  1) Re-consult with dental when patient is ready for transport to dental room for panoramic radiograph     Bill Castorena DDS #[id number]

## 2021-10-07 NOTE — PROGRESS NOTE ADULT - SUBJECTIVE AND OBJECTIVE BOX
[facility] Division of Hospital Medicine  Cinthya Ocampo MD  Pager (M-F, 8A-5P): [phone]  Other Times:  [phone]    Patient is a 71y old  Female who presents with a chief complaint of Progressive dyspnea (06 Oct 2021 20:11)      SUBJECTIVE / OVERNIGHT EVENTS:  low grade fever overnight. had some chills. still with left foot pain improved with meds.      ADDITIONAL REVIEW OF SYSTEMS: otherwise neg    MEDICATIONS  (STANDING):  atorvastatin 20 milliGRAM(s) Oral at bedtime  enoxaparin Injectable 40 milliGRAM(s) SubCutaneous daily  ferrous    sulfate 325 milliGRAM(s) Oral once  furosemide   Injectable 40 milliGRAM(s) IV Push daily  influenza   Vaccine 0.5 milliLiter(s) IntraMuscular once  magnesium oxide 400 milliGRAM(s) Oral two times a day with meals  pantoprazole    Tablet 40 milliGRAM(s) Oral before breakfast    MEDICATIONS  (PRN):  acetaminophen   Tablet .. 650 milliGRAM(s) Oral every 6 hours PRN Temp greater or equal to 38C (100.4F), Mild Pain (1 - 3)      CAPILLARY BLOOD GLUCOSE        I&O's Summary    06 Oct 2021 07:01  -  07 Oct 2021 07:00  --------------------------------------------------------  IN: 600 mL / OUT: 700 mL / NET: -100 mL        PHYSICAL EXAM:  Vital Signs Last 24 Hrs  T(C): 36.4 (07 Oct 2021 12:17), Max: 37.9 (07 Oct 2021 05:18)  T(F): 97.5 (07 Oct 2021 12:17), Max: 100.2 (07 Oct 2021 05:18)  HR: 92 (07 Oct 2021 12:17) (92 - 108)  BP: 103/62 (07 Oct 2021 12:17) (101/67 - 116/65)  BP(mean): --  RR: 20 (07 Oct 2021 12:17) (18 - 20)  SpO2: 91% (07 Oct 2021 12:17) (88% - 93%)    CONSTITUTIONAL: NAD, well-groomed  EYES:  conjunctiva and sclera clear  ENMT: Moist oral mucosa  NECK: Supple, no palpable masses; no JVD  RESPIRATORY: Normal respiratory effort; lungs are clear to auscultation bilaterally  CARDIOVASCULAR: Regular rate and rhythm, +systolic murmur. 2+ LE edema  ABDOMEN: Nontender to palpation, normoactive bowel sounds, no rebound/guarding  MUSCULOSKELETAL: left 1st toe pain and edema. tenderness  PSYCH: A+O to person, place, and time; affect appropriate  SKIN: No rashes; no palpable lesions    LABS:                        11.0   8.18  )-----------( 223      ( 07 Oct 2021 05:47 )             35.8     10-07    141  |  105  |  52<H>  ----------------------------<  120<H>  4.2   |  20<L>  |  1.46<H>    Ca    9.2      07 Oct 2021 05:47  Phos  3.6     10-07  Mg     1.7     10-07    TPro  7.4  /  Alb  3.9  /  TBili  0.8  /  DBili  x   /  AST  36  /  ALT  31  /  AlkPhos  184<H>  10-06                RADIOLOGY & ADDITIONAL TESTS:  Results Reviewed:   Imaging Personally Reviewed:  Electrocardiogram Personally Reviewed:    COORDINATION OF CARE:  Care Discussed with Consultants/Other Providers [Y/N]:  Prior or Outpatient Records Reviewed [Y/N]:   (2) good, crying

## 2021-10-08 NOTE — PROGRESS NOTE ADULT - SUBJECTIVE AND OBJECTIVE BOX
SUBJ:  No acute events overnight. Plan for ECG     MEDICATIONS  (STANDING):  atorvastatin 20 milliGRAM(s) Oral at bedtime  enoxaparin Injectable 40 milliGRAM(s) SubCutaneous daily  ferrous    sulfate 325 milliGRAM(s) Oral daily  furosemide   Injectable 40 milliGRAM(s) IV Push daily  influenza   Vaccine 0.5 milliLiter(s) IntraMuscular once  magnesium oxide 400 milliGRAM(s) Oral two times a day with meals  pantoprazole    Tablet 40 milliGRAM(s) Oral before breakfast    MEDICATIONS  (PRN):  acetaminophen   Tablet .. 650 milliGRAM(s) Oral every 6 hours PRN Temp greater or equal to 38C (100.4F), Mild Pain (1 - 3)      Vital Signs Last 24 Hrs  T(C): 37 (08 Oct 2021 09:31), Max: 37.1 (07 Oct 2021 20:35)  T(F): 98.6 (08 Oct 2021 08:22), Max: 98.7 (07 Oct 2021 20:35)  HR: 91 (08 Oct 2021 09:31) (91 - 96)  BP: 118/72 (08 Oct 2021 09:31) (97/56 - 118/72)  BP(mean): --  RR: 20 (08 Oct 2021 09:31) (18 - 20)  SpO2: 94% (08 Oct 2021 09:31) (91% - 94%)    REVIEW OF SYSTEMS:  As per HPI, otherwise unremarkable.     PHYSICAL EXAM:  GENERAL: No acute distress, well-developed  CHEST/LUNG: mild crackles in b/l bases   BACK: No spinal tenderness  HEART: Regular rate and rhythm; 3/6 systolic murmur, able to appreciate S2  ABDOMEN: Soft, Nontender, Nondistended; Bowel sounds present  EXTREMITIES: WWP, mild edema   PSYCH: Nl behavior, nl affect  NEUROLOGY: AAOx3, non-focal, cranial nerves intact  SKIN: Normal color, No rashes or lesions    LABS:  CBC Full  -  ( 08 Oct 2021 06:37 )  WBC Count : 8.56 K/uL  RBC Count : 4.13 M/uL  Hemoglobin : 11.0 g/dL  Hematocrit : 35.8 %  Platelet Count - Automated : 249 K/uL  Mean Cell Volume : 86.7 fl  Mean Cell Hemoglobin : 26.6 pg  Mean Cell Hemoglobin Concentration : 30.7 gm/dL    10-08    139  |  104  |  57<H>  ----------------------------<  97  4.0   |  18<L>  |  1.58<H>    Ca    9.4      08 Oct 2021 06:38  Phos  3.6     10-07  Mg     1.7     10-07    Echo: 10/1/2021  1. Mitral annular calcification and calcified mitral leaflets with decreased diastolic opening. Mild mitral regurgitation.  Peak mitral valve gradient equals 19 mm Hg, mean transmitral valve gradient equals 7mm Hg, consistent with moderate mitral stenosis.  Gradients measured at a HR of 87bpm.  2. Calcified aortic valve with decreased opening. Peak transaortic valve gradient equals 28 mm Hg, mean transaortic valve gradient equals 15 mm Hg, estimated aortic valve area equals 1.3 sqcm (by continuity equation), the DI is 0.46, aortic valve velocity time integral equals 43 cm, consistent with moderate aortic stenosis. However, on visual inspection the aortic valve stenosis appears to be severe. Minimal aortic regurgitation.  3. Hyperdynamic left ventricular systolic function.  4. Moderate diastolic dysfunction (Stage II).  5. Right ventricular enlargement with decreased right ventricular systolic function.  *** No previous Echo exam.    IMPRESSION AND PLAN:  71 female with past medical history of mod MS/AS (rheumatic), HFpEF,  gastric antral vascular ectasia with plans for EGD.    1) Pre-operative/procedural Optimization  RCRI 2; 10% 30-day risk of MI, cardiac arrest, and/or death [CAD, CHF]  Functional status METs <4  No absolute contraindications including active chest pain, decompensated heart failure (improved/resolved), and/or life threatening arrythmia.    ·	Intermediate/High risk patient undergoing low risk surgery/procedure OPTIMIZED to the lowest risk predicted by the RCRI base on co-morbidities and nature of operation/procedure.   ·	No further cardiac testing indicated.     2) HFpEF   Compensated now   Secondary to mixed valvular disease with MS/AS and pulmonary HTN (resulting in RV dilation).    pBNP ~12,000 admission down to ~6,000 10/3  [employer].     ·	Continue diuresis with IV furosmide 40 mg daily and consider transition to PO soon.     REC:  - SOB may be in setting of increased filling pressures due to possibly severe AS, component of MS   - can continue gentle diuresis as written  - monitor lytes, K > 4 Mg > 2  - Strict I/Os, daily weights   - monitor on tele   - avoid large shifts in preload or afterload   - appreciate structural and CTS eval, no current indications for TAVR, however she is being considered for surgical intervention pending outpatient TTE review by CTS    Behzad Wen MD, MPH, SALIMA, RPVI, [facility]  Inpatient Cardiovascular Specialist; [facility], [facility] ([facility])  ; [employer] at [facility]  message: [employer] [phone] or Microsoft Teams (text preferred and/or call)  email: [email or web address] Cardiology and Cardiovascular Surgery on-service contact/call information, go to [email or web address] and use "[email or web address]" to login.  Outpatient Cardiology appointments, call  [phone] to arrange with a colleague; I do not have outpatient Cardiology clinic.

## 2021-10-08 NOTE — PROGRESS NOTE ADULT - SUBJECTIVE AND OBJECTIVE BOX
[facility] Division of Hospital Medicine  Cinthya Ocampo MD  Pager (M-F, 8A-5P): [phone]  Other Times:  [phone]    Patient is a 71y old  Female who presents with a chief complaint of Progressive dyspnea (08 Oct 2021 09:38)      SUBJECTIVE / OVERNIGHT EVENTS:  s/p EGD tolerated well. denies any CP or SOB. still with left foot 1st toe pain. no additional fever or chills     ADDITIONAL REVIEW OF SYSTEMS: otherwise neg    MEDICATIONS  (STANDING):  atorvastatin 20 milliGRAM(s) Oral at bedtime  enoxaparin Injectable 40 milliGRAM(s) SubCutaneous daily  ferrous    sulfate 325 milliGRAM(s) Oral daily  furosemide   Injectable 40 milliGRAM(s) IV Push daily  influenza   Vaccine 0.5 milliLiter(s) IntraMuscular once  magnesium oxide 400 milliGRAM(s) Oral two times a day with meals  pantoprazole    Tablet 40 milliGRAM(s) Oral before breakfast  predniSONE   Tablet 40 milliGRAM(s) Oral once    MEDICATIONS  (PRN):  acetaminophen   Tablet .. 650 milliGRAM(s) Oral every 6 hours PRN Temp greater or equal to 38C (100.4F), Mild Pain (1 - 3)      CAPILLARY BLOOD GLUCOSE        I&O's Summary    07 Oct 2021 07:01  -  08 Oct 2021 07:00  --------------------------------------------------------  IN: 0 mL / OUT: 200 mL / NET: -200 mL        PHYSICAL EXAM:  Vital Signs Last 24 Hrs  T(C): 36.9 (08 Oct 2021 12:04), Max: 37.1 (07 Oct 2021 20:35)  T(F): 98.4 (08 Oct 2021 12:04), Max: 98.7 (07 Oct 2021 20:35)  HR: 98 (08 Oct 2021 12:04) (83 - 98)  BP: 118/73 (08 Oct 2021 12:04) (97/56 - 125/57)  BP(mean): --  RR: 20 (08 Oct 2021 12:04) (14 - 21)  SpO2: 92% (08 Oct 2021 12:04) (91% - 98%)    CONSTITUTIONAL: NAD, well-groomed  EYES:  conjunctiva and sclera clear  ENMT: Moist oral mucosa  NECK: Supple, no palpable masses; no JVD  RESPIRATORY: Normal respiratory effort; lungs are clear to auscultation bilaterally  CARDIOVASCULAR: Regular rate and rhythm, +systolic murmur. 2+ LE edema  ABDOMEN: Nontender to palpation, normoactive bowel sounds, no rebound/guarding  MUSCULOSKELETAL: left 1st toe pain and edema. tenderness  PSYCH: A+O to person, place, and time; affect appropriate  SKIN: No rashes; no palpable lesions    LABS:                        11.0   8.56  )-----------( 249      ( 08 Oct 2021 06:37 )             35.8     10-08    139  |  104  |  57<H>  ----------------------------<  97  4.0   |  18<L>  |  1.58<H>    Ca    9.4      08 Oct 2021 06:38  Phos  3.6     10-07  Mg     1.7     10-07                  RADIOLOGY & ADDITIONAL TESTS:  Results Reviewed:   Imaging Personally Reviewed:  Electrocardiogram Personally Reviewed:    COORDINATION OF CARE:  Care Discussed with Consultants/Other Providers [Y/N]:  Prior or Outpatient Records Reviewed [Y/N]:

## 2021-10-08 NOTE — PRE-ANESTHESIA EVALUATION ADULT - NSANTHOSAYNRD_GEN_A_CORE
No. SENTHIL screening performed.  STOP BANG Legend: 0-2 = LOW Risk; 3-4 = INTERMEDIATE Risk; 5-8 = HIGH Risk

## 2021-10-08 NOTE — PROGRESS NOTE ADULT - ASSESSMENT
71F PMH GAVE, severe MV/AV disease c/b recently dx'd CHF p/w 2-week h/o progressively worsening dyspnea a/w significantly dec ET and LE swelling. On presentation, found to be in respiratory distress initially requiring BIPAP. Labs significant for elevated BNP(02608). Admitted to medicine for decompensated heart failure likely 2/2 aortic vs mitral valvulopathy in s/o h/o rheumatic heart disease.

## 2021-10-08 NOTE — PRE PROCEDURE NOTE - PRE PROCEDURE EVALUATION
Attending Physician:      Dr. Henning                      Procedure:  EGD    Indication for Procedure:   Symptomatic Anemia  ________________________________________________________  PAST MEDICAL & SURGICAL HISTORY:    CHF due to valvular disease  HTN (hypertension)  Heart valve disease  H/O: rheumatic fever  HLD (hyperlipidemia)      ALLERGIES:  Bactrim (Anaphylaxis)  codine (Unknown)  contrast media (iodine-based) (Anaphylaxis)  mercury (Unknown)    HOME MEDICATIONS:    AICD/PPM: [ ] yes   [X ] no    PERTINENT LAB DATA:                        11.0   8.56  )-----------( 249      ( 08 Oct 2021 06:37 )             35.8     10-08    139  |  104  |  57<H>  ----------------------------<  97  4.0   |  18<L>  |  1.58<H>    Ca    9.4      08 Oct 2021 06:38  Phos  3.6     10-07  Mg     1.7     10-07    PHYSICAL EXAMINATION:    Height (cm): 162.6  Weight (kg): 94.2  BMI (kg/m2): 35.6  BSA (m2): 1.99T(C): 37  HR: 96  BP: 104/71  RR: 18  SpO2: 91%    Constitutional: NAD    Neck:  No JVD  Respiratory: CTAB/L  Cardiovascular: S1 and S2  Gastrointestinal: BS+, soft, NT/ND  Extremities: No peripheral edema  Neurological: A/O x 4      COMMENTS:    The patient is a suitable candidate for the planned procedure unless box checked [ ]  No, explain:

## 2021-10-09 NOTE — PROGRESS NOTE ADULT - NSPROGADDITIONALINFOA_GEN_ALL_CORE
Case and plan discussed with ACP: Izzy
Case and plan discussed with ACP Tameka
Case and plan discussed with ARELI herrera

## 2021-10-09 NOTE — PROGRESS NOTE ADULT - SUBJECTIVE AND OBJECTIVE BOX
[facility] Division of Hospital Medicine  Cinthya Ocampo MD  Pager (M-F, 8A-5P): [phone]  Other Times:  [phone]    Patient is a 71y old  Female who presents with a chief complaint of Progressive dyspnea (08 Oct 2021 15:17)      SUBJECTIVE / OVERNIGHT EVENTS:  left foot pain improved with steroid. afebrile. +SALMON. stilll with difficulty ambulating.   no acute overnight issues    ADDITIONAL REVIEW OF SYSTEMS: otherwise neg    MEDICATIONS  (STANDING):  atorvastatin 20 milliGRAM(s) Oral at bedtime  enoxaparin Injectable 40 milliGRAM(s) SubCutaneous daily  ferrous    sulfate 325 milliGRAM(s) Oral daily  furosemide   Injectable 40 milliGRAM(s) IV Push daily  furosemide   Injectable 20 milliGRAM(s) IV Push once  influenza   Vaccine 0.5 milliLiter(s) IntraMuscular once  magnesium oxide 400 milliGRAM(s) Oral two times a day with meals  pantoprazole    Tablet 40 milliGRAM(s) Oral before breakfast    MEDICATIONS  (PRN):  acetaminophen   Tablet .. 650 milliGRAM(s) Oral every 6 hours PRN Temp greater or equal to 38C (100.4F), Mild Pain (1 - 3)      CAPILLARY BLOOD GLUCOSE        I&O's Summary    08 Oct 2021 07:01  -  09 Oct 2021 07:00  --------------------------------------------------------  IN: 0 mL / OUT: 220 mL / NET: -220 mL    09 Oct 2021 07:01  -  09 Oct 2021 13:31  --------------------------------------------------------  IN: 240 mL / OUT: 0 mL / NET: 240 mL        PHYSICAL EXAM:  Vital Signs Last 24 Hrs  T(C): 36.7 (09 Oct 2021 10:39), Max: 36.8 (08 Oct 2021 20:54)  T(F): 98.1 (09 Oct 2021 10:39), Max: 98.2 (08 Oct 2021 20:54)  HR: 86 (09 Oct 2021 10:39) (83 - 90)  BP: 94/59 (09 Oct 2021 10:39) (94/59 - 107/68)  BP(mean): --  RR: 18 (09 Oct 2021 10:39) (18 - 20)  SpO2: 97% (09 Oct 2021 10:39) (92% - 97%)    CONSTITUTIONAL: NAD, well-groomed  EYES:  conjunctiva and sclera clear  ENMT: Moist oral mucosa  NECK: Supple, no palpable masses; no JVD  RESPIRATORY: Normal respiratory effort; lungs are clear to auscultation bilaterally  CARDIOVASCULAR: Regular rate and rhythm, +systolic murmur. 2+ LE edema  ABDOMEN: Nontender to palpation, normoactive bowel sounds, no rebound/guarding  MUSCULOSKELETAL: left 1st toe pain and edema. tenderness  PSYCH: A+O to person, place, and time; affect appropriate  SKIN: No rashes; no palpable lesions    LABS:                        11.0   8.56  )-----------( 249      ( 08 Oct 2021 06:37 )             35.8     10-08    139  |  104  |  57<H>  ----------------------------<  97  4.0   |  18<L>  |  1.58<H>    Ca    9.4      08 Oct 2021 06:38            Urinalysis Basic - ( 08 Oct 2021 20:37 )    Color: Yellow / Appearance: Clear / S.023 / pH: x  Gluc: x / Ketone: Negative  / Bili: Negative / Urobili: Negative   Blood: x / Protein: Negative / Nitrite: Negative   Leuk Esterase: Negative / RBC: x / WBC x   Sq Epi: x / Non Sq Epi: x / Bacteria: x          RADIOLOGY & ADDITIONAL TESTS:  Results Reviewed:   Imaging Personally Reviewed:  Electrocardiogram Personally Reviewed:    COORDINATION OF CARE:  Care Discussed with Consultants/Other Providers [Y/N]:  Prior or Outpatient Records Reviewed [Y/N]:

## 2021-10-09 NOTE — PROGRESS NOTE ADULT - ASSESSMENT
71F PMH GAVE, severe MV/AV disease c/b recently dx'd CHF p/w 2-week h/o progressively worsening dyspnea a/w significantly dec ET and LE swelling. On presentation, found to be in respiratory distress initially requiring BIPAP. Labs significant for elevated BNP(51559). Admitted to medicine for decompensated heart failure likely 2/2 aortic vs mitral valvulopathy in s/o h/o rheumatic heart disease.

## 2021-10-10 NOTE — CHART NOTE - NSCHARTNOTEFT_GEN_A_CORE
c/o left great toe pain concerning for Gout, had a dose of Prednisone on 10/8  Discussed Dr. Sheets. As per Dr. Sheets, may continue prednisone 40 mg X 5 days, however pt agrees with one dose today only, she wants to be reevaluated for further need of steroid tomorrow.    Katie Jang NP-C  #[id number]

## 2021-10-10 NOTE — PROGRESS NOTE ADULT - ASSESSMENT
71F PMH GAVE, severe MV/AV disease c/b recently dx'd CHF p/w 2-week h/o progressively worsening dyspnea a/w significantly dec ET and LE swelling. On presentation, found to be in respiratory distress initially requiring BIPAP. Labs significant for elevated BNP(98718). Admitted to medicine for decompensated heart failure likely 2/2 aortic vs mitral valvulopathy in s/o h/o rheumatic heart disease.

## 2021-10-10 NOTE — PROGRESS NOTE ADULT - SUBJECTIVE AND OBJECTIVE BOX
[facility] Division of Hospital Medicine  Trang Sheets MD  Pager (M-F, 8A-5P): [phone]  Other Times:  [phone]      Patient is a 71y old  Female who presents with a chief complaint of Progressive dyspnea       SUBJECTIVE / OVERNIGHT EVENTS:  No acute events    ADDITIONAL REVIEW OF SYSTEMS: negative    MEDICATIONS  (STANDING):  atorvastatin 20 milliGRAM(s) Oral at bedtime  enoxaparin Injectable 40 milliGRAM(s) SubCutaneous daily  ferrous    sulfate 325 milliGRAM(s) Oral daily  furosemide   Injectable 40 milliGRAM(s) IV Push daily  furosemide   Injectable 20 milliGRAM(s) IV Push once  influenza   Vaccine 0.5 milliLiter(s) IntraMuscular once  magnesium oxide 400 milliGRAM(s) Oral two times a day with meals  pantoprazole    Tablet 40 milliGRAM(s) Oral before breakfast    MEDICATIONS  (PRN):  acetaminophen   Tablet .. 650 milliGRAM(s) Oral every 6 hours PRN Temp greater or equal to 38C (100.4F), Mild Pain (1 - 3)      CAPILLARY BLOOD GLUCOSE        I&O's Summary    09 Oct 2021 07:  -  10 Oct 2021 07:00  --------------------------------------------------------  IN: 840 mL / OUT: 900 mL / NET: -60 mL    10 Oct 2021 07:  -  10 Oct 2021 15:41  --------------------------------------------------------  IN: 600 mL / OUT: 400 mL / NET: 200 mL            PHYSICAL EXAM:  Vital Signs Last 24 Hrs  T(C): 36.7 (10-10-21 @ 10:36), Max: 36.7 (10-10-21 @ 10:36)  T(F): 98.1 (10-10-21 @ 10:36), Max: 98.1 (10-10-21 @ 10:36)  HR: 82 (10-10-21 @ 10:36) (82 - 94)  BP: 107/71 (10-10-21 @ 10:36) (107/71 - 119/76)  RR: 18 (10-10-21 @ 10:36) (17 - 18)  SpO2: 92% (10-10-21 @ 10:36) (90% - 92%)  Wt(kg): --    CONSTITUTIONAL: NAD, well-groomed  EYES:  conjunctiva and sclera clear  ENMT: Moist oral mucosa  NECK: Supple, no palpable masses; no JVD  RESPIRATORY: Normal respiratory effort; lungs are clear to auscultation bilaterally  CARDIOVASCULAR: Regular rate and rhythm, +systolic murmur. 2+ LE edema  ABDOMEN: Nontender to palpation, normoactive bowel sounds, no rebound/guarding  MUSCULOSKELETAL: left 1st toe pain and edema. tenderness  PSYCH: A+O to person, place, and time; affect appropriate  SKIN: No rashes; no palpable lesions    LABS:                           11.1   8.41  )-----------( 317      ( 10 Oct 2021 07:27 )             37.2       10-10    144  |  107  |  64<H>  ----------------------------<  88  4.0   |  21<L>  |  1.69<H>    Ca    9.3      10 Oct 2021 07:22  Mg     1.9     10                Urinalysis Basic - ( 08 Oct 2021 20:37 )    Color: Yellow / Appearance: Clear / S.023 / pH: x  Gluc: x / Ketone: Negative  / Bili: Negative / Urobili: Negative   Blood: x / Protein: Negative / Nitrite: Negative   Leuk Esterase: Negative / RBC: x / WBC x   Sq Epi: x / Non Sq Epi: x / Bacteria: x                  CAPILLARY BLOOD GLUCOSE

## 2021-10-11 NOTE — CONSULT NOTE ADULT - CONSULT REASON
Dental clearance prior to cardiac surgery
Valvular Heart Disease
Rule out odontogenic infection
Valvular disease
History of GAVE
Valvular Heart Disease

## 2021-10-11 NOTE — DIETITIAN INITIAL EVALUATION ADULT. - LITERATURE/VIDEOS GIVEN
Heart failure nutrition therapy, low sodium foods list, heart healthy shopping tips and label reading, by [employer]

## 2021-10-11 NOTE — DIETITIAN INITIAL EVALUATION ADULT. - ORAL INTAKE PTA/DIET HISTORY
Pt reports good PO intake PTA, tries to adhere to low sodium diet. Pt reports hx of pre-DM and has decreased sugar/carb consumption within the past year. A1c is 5.8%- within pre DM range. Pt with some preliminary knowledge about carbohydrate foods and low sodium diet. She also restricts fluids to 6 cups daily at home and takes daily weights.

## 2021-10-11 NOTE — PROGRESS NOTE ADULT - SUBJECTIVE AND OBJECTIVE BOX
SUBJ:    Home Medications:      MEDICATIONS  (STANDING):  atorvastatin 20 milliGRAM(s) Oral at bedtime  enoxaparin Injectable 40 milliGRAM(s) SubCutaneous daily  ferrous    sulfate 325 milliGRAM(s) Oral daily  furosemide   Injectable 40 milliGRAM(s) IV Push two times a day  influenza   Vaccine 0.5 milliLiter(s) IntraMuscular once  magnesium oxide 400 milliGRAM(s) Oral two times a day with meals  magnesium sulfate  IVPB 1 Gram(s) IV Intermittent once  pantoprazole    Tablet 40 milliGRAM(s) Oral before breakfast    MEDICATIONS  (PRN):  acetaminophen   Tablet .. 650 milliGRAM(s) Oral every 6 hours PRN Temp greater or equal to 38C (100.4F), Mild Pain (1 - 3)      Vital Signs Last 24 Hrs  T(C): 36.8 (11 Oct 2021 05:15), Max: 37 (10 Oct 2021 20:44)  T(F): 98.2 (11 Oct 2021 05:15), Max: 98.6 (10 Oct 2021 20:44)  HR: 86 (11 Oct 2021 05:15) (84 - 86)  BP: 132/68 (11 Oct 2021 05:15) (103/67 - 132/68)  BP(mean): --  RR: 17 (11 Oct 2021 05:15) (17 - 18)  SpO2: 94% (11 Oct 2021 05:15) (92% - 94%)    REVIEW OF SYSTEMS:  As per HPI, otherwise unremarkable.     PHYSICAL EXAM:  Constitutional/Appearance: Normal, Well-developed  HEENT:   Normal oral mucosa, no drainage or redness, supple neck  Lymphatic: No lymphadenopathy  Cardiovascular: Normal S1 S2, No edema, RRR  Respiratory: Lungs clear to auscultation, respirations non-labored  Psychiatry: A & O x 3, appropriate affect.   Gastrointestinal:  Soft, Non-tender, no distention  Skin: No rashes, No ecchymoses, No cyanosis	  Neurologic: Non-focal, Alert and oriented x 3  Extremities: Normal range of motion  Vascular: Peripheral pulses palpable 2+ bilaterally (radial)    LABS:  CBC Full  -  ( 11 Oct 2021 05:38 )  WBC Count : 5.69 K/uL  RBC Count : 4.43 M/uL  Hemoglobin : 12.1 g/dL  Hematocrit : 38.6 %  Platelet Count - Automated : 249 K/uL  Mean Cell Volume : 87.1 fl  Mean Cell Hemoglobin : 27.3 pg  Mean Cell Hemoglobin Concentration : 31.3 gm/dL  Auto Neutrophil # : x  Auto Lymphocyte # : x  Auto Monocyte # : x  Auto Eosinophil # : x  Auto Basophil # : x  Auto Neutrophil % : x  Auto Lymphocyte % : x  Auto Monocyte % : x  Auto Eosinophil % : x  Auto Basophil % : x      10-11    142  |  106  |  57<H>  ----------------------------<  159<H>  4.6   |  20<L>  |  1.53<H>    Ca    9.5      11 Oct 2021 05:38  Mg     1.8     10-11              RADIOLOGY & ADDITIONAL STUDIES:  TELEMETRY:  ECG:  TTE:    IMPRESSION AND PLAN:    ***    Behzad Wen MD, MPH, SALIMA, RPVI, [facility]  Inpatient Cardiovascular Specialist; [facility], [facility] ([facility])  ; [employer] at [facility]  message: [employer] [phone] or Microsoft Teams (text preferred and/or call)  email: [email or web address] Cardiology and Cardiovascular Surgery on-service contact/call information, go to [email or web address] and use "[email or web address]" to login.  Outpatient Cardiology appointments, call  [phone] to arrange with a colleague; I do not have outpatient Cardiology clinic.    SUBJ:  No acute events overnight. Diuresis somewhat suboptimal with pBNP increasing over the course of the week while on IV furosemide 40 mg daily, since increased to BID with net neutral over the course of the entire hospitalization. Remains on room air and breathing comfortably. Of note, patient has moderate valvular disease and is not recommended for a percutaneous intervention by structural heart and possible although unlikely surgical intervention with CTS Dr. Price.      MEDICATIONS  (STANDING):  atorvastatin 20 milliGRAM(s) Oral at bedtime  enoxaparin Injectable 40 milliGRAM(s) SubCutaneous daily  ferrous    sulfate 325 milliGRAM(s) Oral daily  furosemide   Injectable 40 milliGRAM(s) IV Push two times a day  influenza   Vaccine 0.5 milliLiter(s) IntraMuscular once  magnesium oxide 400 milliGRAM(s) Oral two times a day with meals  magnesium sulfate  IVPB 1 Gram(s) IV Intermittent once  pantoprazole    Tablet 40 milliGRAM(s) Oral before breakfast    MEDICATIONS  (PRN):  acetaminophen   Tablet .. 650 milliGRAM(s) Oral every 6 hours PRN Temp greater or equal to 38C (100.4F), Mild Pain (1 - 3)    Vital Signs Last 24 Hrs  T(C): 36.8 (11 Oct 2021 05:15), Max: 37 (10 Oct 2021 20:44)  T(F): 98.2 (11 Oct 2021 05:15), Max: 98.6 (10 Oct 2021 20:44)  HR: 86 (11 Oct 2021 05:15) (84 - 86)  BP: 132/68 (11 Oct 2021 05:15) (103/67 - 132/68)  RR: 17 (11 Oct 2021 05:15) (17 - 18)  SpO2: 94% (11 Oct 2021 05:15) (92% - 94%)    REVIEW OF SYSTEMS:  As per HPI, otherwise unremarkable.     PHYSICAL EXAM:  Constitutional/Appearance: Normal, Well-developed  HEENT:   Normal oral mucosa, supple neck  Lymphatic: No lymphadenopathy  Cardiovascular: Normal S1 S2  Respiratory: respirations non-labored  Psychiatry: A & O x 3  Gastrointestinal:  Soft, Non-tender  Skin: No rashes, No ecchymoses, No cyanosis	  Neurologic: Non-focal  Extremities: Normal range of motion  Vascular: Peripheral pulses palpable 2+ bilaterally (radial)    LABS:  CBC Full  -  ( 11 Oct 2021 05:38 )  WBC Count : 5.69 K/uL  RBC Count : 4.43 M/uL  Hemoglobin : 12.1 g/dL  Hematocrit : 38.6 %  Platelet Count - Automated : 249 K/uL  Mean Cell Volume : 87.1 fl  Mean Cell Hemoglobin : 27.3 pg  Mean Cell Hemoglobin Concentration : 31.3 gm/dL    10-11    142  |  106  |  57<H>  ----------------------------<  159<H>  4.6   |  20<L>  |  1.53<H>    Ca    9.5      11 Oct 2021 05:38  Mg     1.8     10-11    TTE: 10/1/2021  Conclusions:  1. Mitral annular calcification and calcified mitral  leaflets with decreased diastolic opening. Mild mitral  regurgitation.  Peak mitral valve gradient equals 19 mm Hg,  mean transmitral valve gradient equals 7 mm Hg, consistent  with moderate mitral stenosis.  Gradients measured at a HR of 87bpm.  2. Calcified aortic valve with decreased opening. Peak  transaortic valve gradient equals 28 mm Hg, mean  transaortic valve gradient equals 15 mm Hg, estimated  aortic valve area equals 1.3 sqcm (by continuity equation),  the DI is 0.46, aortic valve velocity time integral equals  43 cm, consistent with moderate aortic stenosis.  however on visual inspection the aortic valve stenosis  appears to be severe. Minimal aortic regurgitation.  3. Hyperdynamic left ventricular systolic function.  4. Moderate diastolic dysfunction (Stage II).  5. Right ventricular enlargement with decreased right  ventricular systolic function.  *** No previous Echo exam.    IMPRESSION AND PLAN:  71 female with past medical history of HTN, HLD, mod MS/AS (rheumatic), HFpEF,  gastric antral vascular ectasia presenting with decompensated diastolic heart failure.     1) HFpEF   Unclear volume status  Secondary to mixed valvular disease with MS/AS and pulmonary HTN (resulting in RV dilation).    pBNP ~12,000 admission down to ~6,000 10/3 but has since increased to ~13,000  [employer].     ·	Increase diuresis to IV furosemide 40 mg BID.  ·	Repeat pBNP after modification.   ·	Consider for surgical intervention pending review by CTS or AS/MS; unlikely I suspect.   ·	Start metoprolol tartrate 12.5 mg BID (transition to succinate prior to discharge)     2) HTN   Well controlled.   Home regimen lisinopril 20 mg daily.     ·	Continue monitoring blood pressure while off anti-hypertensives.   ·	Consider resuming lower dose if clinically indicated.     3) HLD   CAD     ·	Continue medical management with atorvastatin 20 mg nightly.     Behzad Wen MD, MPH, SALIMA, RPVI, [facility]  Inpatient Cardiovascular Specialist; [facility], [facility] ([facility])  ; [employer] at [facility]  message: [employer] [phone] or Microsoft Teams (text preferred and/or call)  email: [email or web address] Cardiology and Cardiovascular Surgery on-service contact/call information, go to [email or web address] and use "[email or web address]" to login.  Outpatient Cardiology appointments, call  [phone] to arrange with a colleague; I do not have outpatient Cardiology clinic.

## 2021-10-11 NOTE — DIETITIAN INITIAL EVALUATION ADULT. - PERTINENT LABORATORY DATA
10-11 Na142 mmol/L Glu 159 mg/dL<H> K+ 4.6 mmol/L Cr  1.53 mg/dL<H> BUN 57 mg/dL<H> Phos n/a   Alb n/a   PAB n/a       A1C with Estimated Average Glucose Result: 5.8 % (10-01-21 @ 09:07)

## 2021-10-11 NOTE — PROGRESS NOTE ADULT - SUBJECTIVE AND OBJECTIVE BOX
[facility] Division of Hospital Medicine  Ovi Andrade MD, SALIMA  Pager (LALITO-F, 8A-5P): [phone]  Other Times:  [phone]    Patient is a 71y old  Female who presents with a chief complaint of Progressive dyspnea (11 Oct 2021 10:41)      SUBJECTIVE / OVERNIGHT EVENTS:  No events overnight. States that the left toe pain has mostly resolved now s/p prednisone.     MEDICATIONS  (STANDING):  atorvastatin 20 milliGRAM(s) Oral at bedtime  enoxaparin Injectable 40 milliGRAM(s) SubCutaneous daily  ferrous    sulfate 325 milliGRAM(s) Oral daily  furosemide   Injectable 40 milliGRAM(s) IV Push two times a day  influenza   Vaccine 0.5 milliLiter(s) IntraMuscular once  magnesium oxide 400 milliGRAM(s) Oral two times a day with meals  pantoprazole    Tablet 40 milliGRAM(s) Oral before breakfast  predniSONE   Tablet 20 milliGRAM(s) Oral once    MEDICATIONS  (PRN):  acetaminophen   Tablet .. 650 milliGRAM(s) Oral every 6 hours PRN Temp greater or equal to 38C (100.4F), Mild Pain (1 - 3)    CAPILLARY BLOOD GLUCOSE        I&O's Summary    10 Oct 2021 07:01  -  11 Oct 2021 07:00  --------------------------------------------------------  IN: 840 mL / OUT: 1400 mL / NET: -560 mL    11 Oct 2021 07:01  -  11 Oct 2021 19:02  --------------------------------------------------------  IN: 840 mL / OUT: 1 mL / NET: 839 mL        PHYSICAL EXAM:  Vital Signs Last 24 Hrs  T(C): 36.5 (11 Oct 2021 11:00), Max: 37 (10 Oct 2021 20:44)  T(F): 97.7 (11 Oct 2021 11:00), Max: 98.6 (10 Oct 2021 20:44)  HR: 93 (11 Oct 2021 16:47) (75 - 93)  BP: 93/60 (11 Oct 2021 16:46) (93/60 - 132/68)  BP(mean): --  RR: 20 (11 Oct 2021 16:47) (17 - 20)  SpO2: 92% (11 Oct 2021 16:47) (92% - 96%)    CONSTITUTIONAL: NAD, well-groomed  EYES:  conjunctiva and sclera clear  ENMT: Moist oral mucosa  NECK: Supple, no palpable masses; no JVD  RESPIRATORY: Normal respiratory effort; lungs are clear to auscultation bilaterally  CARDIOVASCULAR: Regular rate and rhythm, +systolic murmur. 2+ LE edema  ABDOMEN: Nontender to palpation, normoactive bowel sounds, no rebound/guarding  MUSCULOSKELETAL: left 1st toe pain and edema - improved  PSYCH: A+O to person, place, and time; affect appropriate  SKIN: No rashes; no palpable lesions      LABS:                        12.1   5.69  )-----------( 249      ( 11 Oct 2021 05:38 )             38.6     10-11    142  |  106  |  57<H>  ----------------------------<  159<H>  4.6   |  20<L>  |  1.53<H>    Ca    9.5      11 Oct 2021 05:38  Mg     1.8     10-11        Culture - Urine (collected 08 Oct 2021 22:54)  Source: Clean Catch Clean Catch (Midstream)  Final Report (11 Oct 2021 11:17):    >=3 organisms. Probable collection contamination.        RADIOLOGY & ADDITIONAL TESTS:    Lab Results Reviewed: CKD stable    COORDINATION OF CARE:  Care Discussed with Consultants/Other Providers:  cardiology re: plan of care

## 2021-10-11 NOTE — CHART NOTE - NSCHARTNOTEFT_GEN_A_CORE
RD CHF education chart note    Patient was visited by RD for CHF education. Heart failure education provided to the patient in detail. Discussed heart failure nutrition therapy, sodium and fluid intake, importance of diet adherence, daily weights monitoring with the patient. Reinforced importance of weight gain parameters and importance of contacting MD’s about weight changes. Provided handouts on heart failure nutrition therapy, reading heart healthy nutrition labels, heart healthy shopping tips and low sodium food list. Patient verbalized understanding demonstrated by teach back method. RD contact information left with patient for any future questioning.      Dee Caro RD, CDN. Pager: [phone]

## 2021-10-11 NOTE — CONSULT NOTE ADULT - SUBJECTIVE AND OBJECTIVE BOX
Patient is a 71y old  Female who presents with a chief complaint of Progressive dyspnea (11 Oct 2021 19:02)      HPI:  71F PMH GAVE, severe MV/AV disease c/b recently dx'd CHF p/w 2-week h/o progressively worsening dyspnea. She reports significantly reduced ET to the point she can't even walk to the bathroom without feeling short of breath. She also has associated orthopnea and worsening leg swelling. She also endorses a cough productive of whitish phlegm. 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 She was told she would need replacement/repair of her valve(s)  however declined due to potential blood transfusion for blood loss during surgery as she is a Jehovah Witness. 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 resulting in anemia d/t blood loss that has required blood transfusions in the past.  Denied HA, lightheadedness, palpitations, CP, abdominal pain, melena/hematochezia, dietary indiscretion or medication non-adherence.    ED course: Lasix IV 40mg x1, MgSO4 1mg x2   (01 Oct 2021 00:03)      PAST MEDICAL & SURGICAL HISTORY:  CHF due to valvular disease    HTN (hypertension)    Heart valve disease    H/O: rheumatic fever    HLD (hyperlipidemia)        MEDICATIONS  (STANDING):  atorvastatin 20 milliGRAM(s) Oral at bedtime  enoxaparin Injectable 40 milliGRAM(s) SubCutaneous daily  ferrous    sulfate 325 milliGRAM(s) Oral daily  furosemide   Injectable 40 milliGRAM(s) IV Push two times a day  influenza   Vaccine 0.5 milliLiter(s) IntraMuscular once  magnesium oxide 400 milliGRAM(s) Oral two times a day with meals  pantoprazole    Tablet 40 milliGRAM(s) Oral before breakfast  predniSONE   Tablet 20 milliGRAM(s) Oral once    MEDICATIONS  (PRN):  acetaminophen   Tablet .. 650 milliGRAM(s) Oral every 6 hours PRN Temp greater or equal to 38C (100.4F), Mild Pain (1 - 3)      Allergies    Bactrim (Anaphylaxis)  codine (Unknown)  contrast media (iodine-based) (Anaphylaxis)  mercury (Unknown)    Intolerances        FAMILY HISTORY:  No pertinent family history in first degree relatives        *SOCIAL HISTORY: (guardian or who pt came with), (smoking hx)    *Last Dental Visit:    Vital Signs Last 24 Hrs  T(C): 36.5 (11 Oct 2021 11:00), Max: 37 (10 Oct 2021 20:44)  T(F): 97.7 (11 Oct 2021 11:00), Max: 98.6 (10 Oct 2021 20:44)  HR: 93 (11 Oct 2021 16:47) (75 - 93)  BP: 93/60 (11 Oct 2021 16:46) (93/60 - 132/68)  BP(mean): --  RR: 20 (11 Oct 2021 16:47) (17 - 20)  SpO2: 92% (11 Oct 2021 16:47) (92% - 96%)      Dentition present: <<   >>  Mobility: <<  >>  Caries: <<   >>     LABS:                        12.1   5.69  )-----------( 249      ( 11 Oct 2021 05:38 )             38.6     10-11    142  |  106  |  57<H>  ----------------------------<  159<H>  4.6   |  20<L>  |  1.53<H>    Ca    9.5      11 Oct 2021 05:38  Mg     1.8     10-11      WBC Count: 5.69 K/uL [3.80 - 10.50] (10-11 @ 05:38)  Platelet Count - Automated: 249 K/uL [150 - 400] (10-11 @ 05:38)  WBC Count: 8.41 K/uL [3.80 - 10.50] (10-10 @ 07:27)  Platelet Count - Automated: 317 K/uL [150 - 400] (10-10 @ 07:27)  WBC Count: 8.31 K/uL [3.80 - 10.50] (10-09 @ 14:48)  Platelet Count - Automated: 275 K/uL [150 - 400] (10-09 @ 14:48)  Culture Results:   >=3 organisms. Probable collection contamination. (10-08 @ 22:54)    EOE:  TMJ (-) clicks                    (-) pops                    (-) crepitus             Mandible FROM             Facial bones and MOM grossly intact             (-) trismus             (-) LAD             (-) swelling             (-) asymmetry             (-) palpation             (-) SOB             (-) dysphagia             (-) LOC    IOE:  Completely edentulous maxilla, partially edentulous mandible with gross caries observed on #22/23. Slight tenderness to percussion/palpation of buccal vestibule, but no swelling/fluctuance observed.           hard/soft palate:  (-)  palatal torus           tongue/FOM WNL           labial/buccal mucosa WNL      *DENTAL RADIOGRAPHS: Panoramic radiograph taken     ASSESSMENT: #22 and 23 carious. #22 with post and crown. No sign of acute infection clinically or radiographically. Patient is cleared for cardiac surgery from a dental perspective but should follow up with an outpatient dentist for comprehensive care when discharged,       RECOMMENDATIONS:  1) Dental F/U with outpatient dentist for comprehensive dental care.     Gela Boone DDS #[id number]

## 2021-10-11 NOTE — DIETITIAN INITIAL EVALUATION ADULT. - CHIEF COMPLAINT
"71F PMH GAVE, severe MV/AV disease c/b recently dx'd CHF p/w 2-week h/o progressively worsening dyspnea a/w significantly dec ET and LE swelling. On presentation, found to be in respiratory distress initially requiring BIPAP. Labs significant for elevated BNP(82925). Admitted to medicine for decompensated heart failure likely 2/2 aortic vs mitral valvulopathy in s/o h/o rheumatic heart disease."

## 2021-10-11 NOTE — DIETITIAN INITIAL EVALUATION ADULT. - ADD RECOMMEND
Continue current diet, fluid restriction as per team. Monitor PO intake, GI tolerance, skin integrity and labs. RD remains available if needed, pt is aware.

## 2021-10-11 NOTE — CONSULT NOTE ADULT - CONSULT REQUESTED DATE/TIME
04-Oct-2021 19:36
11-Oct-2021 19:19
02-Oct-2021 12:55
06-Oct-2021 20:11
06-Oct-2021 13:07
04-Oct-2021 16:00

## 2021-10-11 NOTE — DIETITIAN INITIAL EVALUATION ADULT. - PERTINENT MEDS FT
MEDICATIONS  (STANDING):  atorvastatin 20 milliGRAM(s) Oral at bedtime  enoxaparin Injectable 40 milliGRAM(s) SubCutaneous daily  ferrous    sulfate 325 milliGRAM(s) Oral daily  furosemide   Injectable 40 milliGRAM(s) IV Push two times a day  influenza   Vaccine 0.5 milliLiter(s) IntraMuscular once  magnesium oxide 400 milliGRAM(s) Oral two times a day with meals  magnesium sulfate  IVPB 1 Gram(s) IV Intermittent once  pantoprazole    Tablet 40 milliGRAM(s) Oral before breakfast

## 2021-10-11 NOTE — DIETITIAN INITIAL EVALUATION ADULT. - OTHER INFO
This admission: Good PO intake. Reports she does not eat fish 2/2 mercury allergy, no nausea/vomiting, no difficulty chewing/swallowing, no GI distress, no micronutrient supplementation PTA, last BM 10/9    Weight Hx: Reports weight loss 2/2 dietary changes. Pt was 292 lbs 1 year ago with successful wt loss to 212 lbs (10/9).

## 2021-10-11 NOTE — CONSULT NOTE ADULT - REASON FOR ADMISSION
Progressive dyspnea

## 2021-10-11 NOTE — PROGRESS NOTE ADULT - ASSESSMENT
71F PMH GAVE, severe MV/AV disease c/b recently dx'd CHF p/w 2-week h/o progressively worsening dyspnea a/w significantly dec ET and LE swelling. On presentation, found to be in respiratory distress initially requiring BIPAP. Labs significant for elevated BNP(33028). Admitted to medicine for decompensated heart failure likely 2/2 aortic vs mitral valvulopathy in s/o h/o rheumatic heart disease.

## 2021-10-12 NOTE — PROGRESS NOTE ADULT - SUBJECTIVE AND OBJECTIVE BOX
[facility] Division of Hospital Medicine  Ovi Andrade MD, SALIMA  I'm reachable on [employer] Teams   Off hours:  [phone] ([facility] pager)    Patient is a 71y old  Female who presents with a chief complaint of Progressive dyspnea (11 Oct 2021 19:19)      SUBJECTIVE / OVERNIGHT EVENTS:  No events overnight. No new complaints.     MEDICATIONS  (STANDING):  atorvastatin 20 milliGRAM(s) Oral at bedtime  enoxaparin Injectable 40 milliGRAM(s) SubCutaneous daily  ferrous    sulfate 325 milliGRAM(s) Oral daily  furosemide   Injectable 40 milliGRAM(s) IV Push two times a day  influenza   Vaccine 0.5 milliLiter(s) IntraMuscular once  magnesium oxide 400 milliGRAM(s) Oral two times a day with meals  pantoprazole    Tablet 40 milliGRAM(s) Oral before breakfast    MEDICATIONS  (PRN):  acetaminophen   Tablet .. 650 milliGRAM(s) Oral every 6 hours PRN Temp greater or equal to 38C (100.4F), Mild Pain (1 - 3)    CAPILLARY BLOOD GLUCOSE        I&O's Summary    11 Oct 2021 07:01  -  12 Oct 2021 07:00  --------------------------------------------------------  IN: 1960 mL / OUT: 301 mL / NET: 1659 mL    12 Oct 2021 07:01  -  12 Oct 2021 20:36  --------------------------------------------------------  IN: 1080 mL / OUT: 800 mL / NET: 280 mL        PHYSICAL EXAM:  Vital Signs Last 24 Hrs  T(C): 36.3 (12 Oct 2021 11:17), Max: 36.6 (11 Oct 2021 21:47)  T(F): 97.4 (12 Oct 2021 11:17), Max: 97.9 (11 Oct 2021 21:47)  HR: 86 (12 Oct 2021 17:43) (77 - 99)  BP: 108/86 (12 Oct 2021 17:43) (105/64 - 116/68)  BP(mean): 72 (12 Oct 2021 05:12) (72 - 72)  RR: 18 (12 Oct 2021 11:17) (18 - 18)  SpO2: 95% (12 Oct 2021 17:43) (75% - 96%)    CONSTITUTIONAL: NAD, well-groomed  EYES:  conjunctiva and sclera clear  ENMT: Moist oral mucosa  NECK: Supple, no palpable masses; no JVD  RESPIRATORY: Normal respiratory effort; lungs are clear to auscultation bilaterally  CARDIOVASCULAR: Regular rate and rhythm, +systolic murmur. 1-2+ LE edema  ABDOMEN: Nontender to palpation, normoactive bowel sounds, no rebound/guarding  MUSCULOSKELETAL: left 1st toe pain and edema - improved  PSYCH: A+O to person, place, and time; affect appropriate  SKIN: No rashes; no palpable lesions    LABS:                        11.6   6.67  )-----------( 337      ( 12 Oct 2021 12:59 )             37.9     10-12    140  |  102  |  62<H>  ----------------------------<  131<H>  4.6   |  21<L>  |  1.60<H>    Ca    9.6      12 Oct 2021 12:59  Mg     2.0     10-12        RADIOLOGY & ADDITIONAL TESTS:    Lab Results Reviewed: Cr 1.6, stable      COORDINATION OF CARE:  Care Discussed with Consultants/Other Providers:  Dr. Price CT surgery - no plan for inpatient surgery given debility

## 2021-10-12 NOTE — PROGRESS NOTE ADULT - ASSESSMENT
71F PMH GAVE, severe MV/AV disease c/b recently dx'd CHF p/w 2-week h/o progressively worsening dyspnea a/w significantly dec ET and LE swelling. On presentation, found to be in respiratory distress initially requiring BIPAP. Labs significant for elevated BNP(55531). Admitted to medicine for decompensated heart failure likely 2/2 aortic vs mitral valvulopathy in s/o h/o rheumatic heart disease.

## 2021-10-13 NOTE — PROGRESS NOTE ADULT - PROBLEM SELECTOR PROBLEM 7
Prophylactic measure
Acute on chronic kidney failure
Prophylactic measure
Prophylactic measure

## 2021-10-13 NOTE — PROGRESS NOTE ADULT - PROBLEM SELECTOR PROBLEM 3
Acute on chronic renal failure
HTN (hypertension)
Acute on chronic renal failure
Acute on chronic renal failure
HTN (hypertension)
Acute on chronic renal failure
Acute on chronic renal failure

## 2021-10-13 NOTE — PROGRESS NOTE ADULT - SUBJECTIVE AND OBJECTIVE BOX
[facility] Division of Hospital Medicine  Ovi Andrade MD, SALIMA  I'm reachable on [employer] Teams   Off hours:  [phone] ([facility] pager)    Patient is a 71y old  Female who presents with a chief complaint of Progressive dyspnea (12 Oct 2021 20:35)      SUBJECTIVE / OVERNIGHT EVENTS:  No events overnight. The patient was able to ambulate today without requiring O2 supplementation.     MEDICATIONS  (STANDING):  atorvastatin 20 milliGRAM(s) Oral at bedtime  enoxaparin Injectable 40 milliGRAM(s) SubCutaneous daily  ferrous    sulfate 325 milliGRAM(s) Oral daily  furosemide    Tablet 80 milliGRAM(s) Oral daily  influenza   Vaccine 0.5 milliLiter(s) IntraMuscular once  magnesium oxide 400 milliGRAM(s) Oral two times a day with meals  metoprolol succinate ER 25 milliGRAM(s) Oral daily  pantoprazole    Tablet 40 milliGRAM(s) Oral before breakfast    MEDICATIONS  (PRN):  acetaminophen   Tablet .. 650 milliGRAM(s) Oral every 6 hours PRN Temp greater or equal to 38C (100.4F), Mild Pain (1 - 3)    CAPILLARY BLOOD GLUCOSE        I&O's Summary    12 Oct 2021 07:01  -  13 Oct 2021 07:00  --------------------------------------------------------  IN: 1380 mL / OUT: 1000 mL / NET: 380 mL    13 Oct 2021 07:01  -  13 Oct 2021 20:09  --------------------------------------------------------  IN: 1000 mL / OUT: 1400 mL / NET: -400 mL        PHYSICAL EXAM:  Vital Signs Last 24 Hrs  T(C): 36.4 (13 Oct 2021 11:00), Max: 36.9 (13 Oct 2021 00:30)  T(F): 97.5 (13 Oct 2021 11:00), Max: 98.4 (13 Oct 2021 00:30)  HR: 82 (13 Oct 2021 16:07) (62 - 82)  BP: 115/74 (13 Oct 2021 16:07) (99/68 - 121/66)  BP(mean): --  RR: 17 (13 Oct 2021 11:00) (17 - 18)  SpO2: 91% (13 Oct 2021 13:51) (91% - 97%)    CONSTITUTIONAL: NAD, well-groomed  EYES:  conjunctiva and sclera clear  ENMT: Moist oral mucosa  NECK: Supple, no palpable masses; no JVD  RESPIRATORY: Normal respiratory effort; lungs are clear to auscultation bilaterally  CARDIOVASCULAR: Regular rate and rhythm, +systolic murmur. 1+ LE edema  ABDOMEN: Nontender to palpation, normoactive bowel sounds, no rebound/guarding  MUSCULOSKELETAL: left 1st toe pain and edema - improved  PSYCH: A+O to person, place, and time; affect appropriate  SKIN: No rashes; no palpable lesions      LABS:                        11.2   9.66  )-----------( 353      ( 13 Oct 2021 06:20 )             36.6     10-13    140  |  104  |  66<H>  ----------------------------<  118<H>  3.7   |  21<L>  |  1.62<H>    Ca    9.3      13 Oct 2021 06:20  Mg     1.9     10-13        RADIOLOGY & ADDITIONAL TESTS:    Lab Results Reviewed    COORDINATION OF CARE:  Care Discussed with Consultants/Other Providers:  Dr. Wen re: diuretics for home

## 2021-10-13 NOTE — PHARMACOTHERAPY INTERVENTION NOTE - COMMENTS
[facility] Medication Education     Heart failure education provided to patient.  -Reviewed doses and possible side effects for current heart failure medications:  MEDICATIONS  (STANDING):  atorvastatin 20 milliGRAM(s) Oral at bedtime  furosemide  Tablet 80 milliGRAM(s) Oral daily  metoprolol succinate ER 25 milliGRAM(s) Oral daily    -Total number of medications:  3  -Literature provided: 3    Medication adherence review:  -Who administers the patient’s medications outpatient? Patient (planning to [facility])  -Is the patient able to name/identify his/her heart failure medications? Always  -Is the patient able to identify the dosing frequency of his/her heart failure medications? Always  -How often does the patient report missing a dose of heart failure medication? Sometimes  -Is the patient able to afford his/her heart failure medications? Yes    Diuretic review:  -Does the patient take a loop diuretic outpatient? Yes  -Is the diuretic prescribed as a standing dose or as needed? Standing dose  -Does the patient take the diuretic as prescribed? Yes  -Does the patient check their weight daily? Yes  -Does the patient know who to escalate to if they notice a change in their weight or heart failure symptoms? Yes    Additional information reviewed:  Low salt diet/fluid restriction  Blood pressure control  Dyslipidemia control    Discharge medication review:  -Does the patient understand any changes made to home heart failure medications upon discharge? Yes    Counseled patient on the above medications names (brand/generic), indication, and possible side effects and provided patient with a medication card. Counseled patient to observe and obtain daily weights and to notify doctor if >2-3 lbs/day or >5lbs/week weight gain, increased short of breath or using more pillows at nighttime. Patient expresses hesitancy in starting beta blocker due to low blood pressure and increased dose of furosemide due to gout attack while inpatient. Patient questions and concerns were answered and addressed. Patient demonstrated understanding. Patient understood importance of compliance and to follow up with cardiologist outpatient after discharge.     Shelbie Lemus, PharmD, [facility]  Clinical Pharmacy Specialist  [phone] or Teams [facility] Medication Education     Heart failure education provided to patient.  -Reviewed doses and possible side effects for current heart failure medications:  MEDICATIONS  (STANDING):  atorvastatin 20 milliGRAM(s) Oral at bedtime  furosemide  Tablet 80 milliGRAM(s) Oral daily  metoprolol succinate ER 25 milliGRAM(s) Oral daily    -Total number of medications:  3  -Literature provided: 3    Medication adherence review:  -Who administers the patient’s medications outpatient? Patient (planning to [facility])  -Is the patient able to name/identify his/her heart failure medications? Always  -Is the patient able to identify the dosing frequency of his/her heart failure medications? Always  -How often does the patient report missing a dose of heart failure medication? Sometimes  -Is the patient able to afford his/her heart failure medications? Yes    Diuretic review:  -Does the patient take a loop diuretic outpatient? Yes  -Is the diuretic prescribed as a standing dose or as needed? Standing dose  -Does the patient take the diuretic as prescribed? Yes  -Does the patient check their weight daily? Yes  -Does the patient know who to escalate to if they notice a change in their weight or heart failure symptoms? Yes    Additional information reviewed:  Low salt diet/fluid restriction  Blood pressure control  Dyslipidemia control    Discharge medication review:  -Does the patient understand any changes made to home heart failure medications upon discharge? Yes    Counseled patient on the above medications names (brand/generic), indication, and possible side effects and provided patient with a medication card. Counseled patient to observe and obtain daily weights and to notify doctor if >2-3 lbs/day or >5lbs/week weight gain, increased short of breath or using more pillows at nighttime. Patient expresses hesitancy in starting new beta blocker due to low blood pressure and increased dose of furosemide (pt was taking furosemide 40mg at home) due to gout attack while inpatient. Patient questions and concerns were answered and addressed. Patient demonstrated understanding. Patient understood importance of compliance and to follow up with cardiologist outpatient after discharge.     Shelbie Lemus, PharmD, [facility]  Clinical Pharmacy Specialist  [phone] or Teams [facility] Medication Education     Heart failure education provided to patient.  -Reviewed doses and possible side effects for current heart failure medications:  MEDICATIONS  (STANDING):  atorvastatin 20 milliGRAM(s) Oral at bedtime  furosemide  Tablet 80 milliGRAM(s) Oral daily  metoprolol succinate ER 25 milliGRAM(s) Oral daily    -Total number of medications:  3  -Literature provided: 3    Medication adherence review:  -Who administers the patient’s medications outpatient? Patient (planning to [facility])  -Is the patient able to name/identify his/her heart failure medications? Always  -Is the patient able to identify the dosing frequency of his/her heart failure medications? Always  -How often does the patient report missing a dose of heart failure medication? Sometimes  -Is the patient able to afford his/her heart failure medications? Yes    Diuretic review:  -Does the patient take a loop diuretic outpatient? Yes  -Is the diuretic prescribed as a standing dose or as needed? Standing dose  -Does the patient take the diuretic as prescribed? Yes  -Does the patient check their weight daily? Yes  -Does the patient know who to escalate to if they notice a change in their weight or heart failure symptoms? Yes    Additional information reviewed:  Low salt diet/fluid restriction  Blood pressure control  Dyslipidemia control    Discharge medication review:  -Does the patient understand any changes made to home heart failure medications upon discharge? Yes    Counseled patient on the above medications names (brand/generic), indication, and possible side effects and provided patient with a medication card. Counseled patient to observe and obtain daily weights and to notify doctor if >2-3 lbs/day or >5lbs/week weight gain, increased short of breath or using more pillows at nighttime. Patient expresses hesitancy in starting new beta blocker due to low blood pressure and increased dose of furosemide (pt was taking furosemide 40mg at home) due to gout attack while inpatient. Patient understands that lisinopril is being held due to KATE and may be resumed once stable outpatient. Patient questions and concerns were answered and addressed. Patient demonstrated understanding. Patient understood importance of compliance and to follow up with cardiologist outpatient after discharge.     Shelbie Lemus, PharmD, [facility]  Clinical Pharmacy Specialist  [phone] or Teams

## 2021-10-13 NOTE — PROGRESS NOTE ADULT - PROBLEM SELECTOR PROBLEM 4
GAVE (gastric antral vascular ectasia)
HTN (hypertension)
GAVE (gastric antral vascular ectasia)
HTN (hypertension)
GAVE (gastric antral vascular ectasia)
GAVE (gastric antral vascular ectasia)
HLD (hyperlipidemia)
HTN (hypertension)
GAVE (gastric antral vascular ectasia)
GAVE (gastric antral vascular ectasia)

## 2021-10-13 NOTE — PROGRESS NOTE ADULT - PROBLEM SELECTOR PLAN 5
suspect possible acute gout attack in setting of diuresis.   no previous history.   s/p 2 doses of colchicine but would limit given renal dysfunction.  improved s/p prednisone - will just treat for 3 days  xray without fracture
suspect possible acute gout attack in setting of diuresis.   no previous history.   s/p 2 doses of colchicine but would limit given renal dysfunction.  received 1 dose prednisone yesterday with some improvement.   may need taper if pain persists.   xray without fracture
Presumed to be from cardiorenal syndrome   -Creatinine Trend: 1.5 today  -avoid nephrotoxic medications  -renally dose all meds
-Hb stable  -monitor daily CBC  -c/w daily PPI
Hb stable at 11.3, no GI bleed now  -c/w daily PPI
suspect possible acute gout attack in setting of diuresis.   no previous history.   s/p 2 doses of colchicine but would limit given renal dysfunction.  d/w pt and will give 1 dose of prednisone and montior response  still awaitng xray foot.
Hb stable at 11.9, no GI bleed now  -continue daily PPI
suspect possible acute gout attack in setting of diuresis.   no previous history.   s/p 2 doses of colchicine but would limit given renal dysfunction.  improved s/p prednisone x 3 doses  xray without fracture
Hb stable at 11.9, no GI bleed now  -c/w daily PPI
suspect possible acute gout attack in setting of diuresis.   no previous history.   will give low dose colchicine x1 and monitor.   improved with IV tylenol  check xray foot.
suspect possible acute gout attack in setting of diuresis.   no previous history.   will give low dose colchicine x1 and monitor.   improved with IV tylenol  check xray foot.
suspect possible acute gout attack in setting of diuresis.   no previous history.   s/p 2 doses of colchicine but would limit given renal dysfunction.  received 1 dose prednisone yesterday with some improvement.   may need taper if pain persists.   xray without fracture
suspect possible acute gout attack in setting of diuresis.   no previous history.   s/p 2 doses of colchicine but would limit given renal dysfunction.  improved s/p prednisone x 3 doses  xray without fracture

## 2021-10-13 NOTE — PROGRESS NOTE ADULT - PROBLEM SELECTOR PLAN 4
Hb stable , no GI bleed now  -continue daily PPI  - s/p EGD with cauterization.
- d/c'd lisinopril, Scr 1.6>1.46  - Closely monitor BP
Hb stable at 11.3, no GI bleed now  -c/w daily PPI
Hb stable , no GI bleed now  -continue daily PPI  - s/p EGD with cauterization.
-c/w statin
Hb stable , no GI bleed now  -continue daily PPI  - s/p EGD with cauterization.
BP has been low, SBP   - d/c'd lisinopril, Scr 1.6  - Closely monitor BP
BP has been low, SBP   - d/c'd lisinopril, Scr 1.6  - Closely monitor BP
Hb stable , no GI bleed now  -continue daily PPI  - s/p EGD with cauterization.
Hb stable , no GI bleed now  -continue daily PPI  - GI eval called for possible clearance before CTS intervention .
Hb stable , no GI bleed now  -continue daily PPI  - GI plan for EGD tomorrow
Hb stable , no GI bleed now  -continue daily PPI  - s/p EGD with cauterization.
Hb stable , no GI bleed now  -continue daily PPI  - s/p EGD with cauterization.

## 2021-10-13 NOTE — PROGRESS NOTE ADULT - PROBLEM SELECTOR PLAN 7
DVT ppx: Lovenox qd  Code: full code
- presumed to be from cardiorenal syndrome   -Creatinine Trend: 1.42<--, 1.39<--, 1.52<--  -treat for decompensated heart failure  -avoid nephrotoxic medications  -renally dose all meds
DVT ppx: Lovenox qd  Code: full code

## 2021-10-13 NOTE — PROGRESS NOTE ADULT - PROVIDER SPECIALTY LIST ADULT
I spoke with the patient's daughter and conveyed Dr. Lord's message.   
Cardiology
Dental
Cardiology
Cardiology
Structural Heart
Hospitalist
Internal Medicine
Hospitalist
Internal Medicine
Hospitalist
Internal Medicine
Hospitalist
Hospitalist
Internal Medicine

## 2021-10-13 NOTE — PROGRESS NOTE ADULT - PROBLEM SELECTOR PROBLEM 1
Acute hypoxemic respiratory failure

## 2021-10-13 NOTE — PROGRESS NOTE ADULT - PROBLEM SELECTOR PLAN 8
low grade fever 99 to 100.2 had some reported chills few days ago   likely due to acute gout; infectious workup negative
low grade fever 99 to 100.2 had some reported chills yesterday  possible due to gout attack but given pending surg intervention will work up for infection  check BlCx and UA, UCx.
low grade fever 99 to 100.2 had some reported chills few days ago   possible due to gout attack but given pending surg intervention will work up for infection   BlCx and UA, UCx.
-Diet: low-salt/heart-healthy  -DVT ppx: Lovenox qd  -Code: full code
low grade fever 99 to 100.2 had some reported chills few days ago   likely due to acute gout; infectious workup negative
low grade fever 99 to 100.2 had some reported chills few days ago   likely due to acute gout; infectious workup negative
low grade fever 99 to 100.2 had some reported chills yesterday  possible due to gout attack but given pending surg intervention will work up for infection  check BlCx and UA, UCx.
low grade fever 99 to 100.2 had some reported chills few days ago   likely due to acute gout; infectious workup negative

## 2021-10-13 NOTE — PROGRESS NOTE ADULT - REASON FOR ADMISSION
Progressive dyspnea

## 2021-10-13 NOTE — PROGRESS NOTE ADULT - PROBLEM SELECTOR PROBLEM 5
Acute on chronic kidney failure
Toe joint pain, left
GAVE (gastric antral vascular ectasia)
Toe joint pain, left
Toe joint pain, left
GAVE (gastric antral vascular ectasia)
Toe joint pain, left

## 2021-10-13 NOTE — PROGRESS NOTE ADULT - PROBLEM SELECTOR PROBLEM 6
Acute on chronic kidney failure
HTN (hypertension)
Electrolyte abnormality
HTN (hypertension)
Prophylactic measure
HTN (hypertension)
Acute on chronic kidney failure
Acute on chronic kidney failure

## 2021-10-13 NOTE — PROGRESS NOTE ADULT - ASSESSMENT
71F PMH GAVE, severe MV/AV disease c/b recently dx'd CHF p/w 2-week h/o progressively worsening dyspnea a/w significantly dec ET and LE swelling. On presentation, found to be in respiratory distress initially requiring BIPAP. Labs significant for elevated BNP(38224). Admitted to medicine for decompensated heart failure likely 2/2 aortic vs mitral valvulopathy in s/o h/o rheumatic heart disease.

## 2021-10-13 NOTE — PROGRESS NOTE ADULT - PROBLEM SELECTOR PROBLEM 2
Acute decompensated heart failure

## 2021-10-13 NOTE — PROGRESS NOTE ADULT - PROBLEM SELECTOR PLAN 6
- d/c'd lisinopril due to elevated Cr.   - Closely monitor BP
- d/c'd lisinopril due to elevated Cr.   - Closely monitor BP
Presumed to be from cardiorenal syndrome   -Creatinine Trend improved  -avoid nephrotoxic medications  -renally dose all meds
Presumed to be from cardiorenal syndrome   -Creatinine Trend: 1.5 today  -avoid nephrotoxic medications  -renally dose all meds
Presumed to be from cardiorenal syndrome   -Creatinine Trend improved  -avoid nephrotoxic medications  -renally dose all meds
- d/c'd lisinopril due to elevated Cr.   - Closely monitor BP
DVT ppx: Lovenox qd  Code: full code
- d/c'd lisinopril due to elevated Cr.   - Closely monitor BP
-Hypomagnesemia  -replete Mg prn

## 2021-10-13 NOTE — PROGRESS NOTE ADULT - PROBLEM SELECTOR PLAN 3
-c/w lisinopril
Elevated Scr to 1.6 from multifactorial- Low BP, lisinopril and possibly cardio-renal   - Off lisinopril, will watch Scr closely  - Renal US and Renal consult if worsens
improved  Elevated Scr  - Low BP, lisinopril and possibly cardio-renal   - Off lisinopril, will watch Scr closely  - Renal US and Renal consult if worsens
stable.   Will cont to monitor.   fluctuating. holding ACE for now.   BP controlled.
BP has been stable  -c/w lisinopril 20mg daily, Scr 1.5
improved  Will cont to monitor.   fluctuating. holding ACE for now.   BP controlled.
improved  Elevated Scr  - Low BP, lisinopril and possibly cardio-renal   - Off lisinopril, monitor Scr closely  - Renal US and Renal consult if worsens
stable.   Will cont to monitor.   fluctuating. holding ACE for now.   BP controlled.
stable.   Will cont to monitor.   fluctuating. holding ACE for now.   BP controlled.
improved  Will cont to monitor.   fluctuating. holding ACE for now.   BP controlled.
stable.   Will cont to monitor.   fluctuating. holding ACE for now.   BP controlled.

## 2021-10-13 NOTE — PROGRESS NOTE ADULT - PROBLEM SELECTOR PLAN 2
Acute on chronic diastolic HF 2/2 likely worsening valvulopathy and less likely d/t dietary non-compliance vs medication non-adherence  -   EF70%, Moderate MS and Moderate AS with stage 2 diastolic dysfunction  - continue Lasix 40mg IV daily. Lisinopril was discontinued for KATE and Low BP  - Strict I/Os, daily weights, fluid restriction to 1L/day, low-Na diet  - Seen by Dr. Price ([facility]) - being considered for surgery   - GI and dental clearance
Likely 2/2 worsening valvulopathy and less likely d/t dietary non-compliance vs medication non-adherence  - Reviewed Echo personally- EF70%, Moderate MS and Moderate AS with stage 2 diastolic dysfunction  - c/w Lasix 40mg IV QD, Lisinopril 20mg qd  - Strict I/Os, daily weights, fluid restriction to 1L/day, low-Na diet  -Called cardiology for further eval  - Patient has schedule to f/u with CTS- Dr Montalvo's once GI clears for Valve Sx ( she is a [employer] witness)
-likely d/t worsening valvulopathy and less likely d/t dietary non-compliance vs medication non-adherence  -c/w lasix 40mg IV QD  -c/w lisinopril 20mg qd  -TTE pending  -Strict I/Os, daily weights, fluid restriction <2L/day, low-Na diet  -Cards eval pending
Acute on chronic diastolic HF 2/2 likely worsening valvulopathy and less likely d/t dietary non-compliance vs medication non-adherence  - EF70%, Moderate MS and Moderate AS with stage 2 diastolic dysfunction  - Lasix 40mg IV BID now. Lisinopril was discontinued for KATE and Low BP  - Strict I/Os, daily weights, fluid restriction to 1L/day, low-Na diet  - Seen by Dr. Price ([facility]) - currently not planned for inpatient surgery due to debility   - GI - s/p EGD with cautery  - dental clearance done
Acute on chronic diastolic HF 2/2 likely worsening valvulopathy and less likely d/t dietary non-compliance vs medication non-adherence  -   EF70%, Moderate MS and Moderate AS with stage 2 diastolic dysfunction  - continue Lasix 40mg IV daily. Lisinopril was discontinued for KATE and Low BP  - Strict I/Os, daily weights, fluid restriction to 1L/day, low-Na diet  - Seen by Dr. Price ([facility]) - being considered for surgery   - GI consult for EGD tomorrow and dental eval pre op
Acute on chronic diastolic HF 2/2 likely worsening valvulopathy and less likely d/t dietary non-compliance vs medication non-adherence  - EF70%, Moderate MS and Moderate AS with stage 2 diastolic dysfunction  - Lisinopril was discontinued for KATE and Low BP  - starting toprol xl 25mg daily   - Strict I/Os, daily weights, fluid restriction to 1L/day, low-Na diet  - Seen by Dr. Price ([facility]) - currently not planned for inpatient surgery due to debility   - GI - s/p EGD with cautery  - dental clearance done
Acute on chronic diastolic HF 2/2 likely worsening valvulopathy and less likely d/t dietary non-compliance vs medication non-adherence  - Reviewed Echo personally- EF70%, Moderate MS and Moderate AS with stage 2 diastolic dysfunction  - c/w Lasix 40mg IV daily, d/c'd Lisinopril for KATE and Low BP  - Strict I/Os, daily weights, fluid restriction to 1L/day, low-Na diet  - Seen by Dr. Price ([facility]) will follow up with her re: further inpt plans for valve dysfunction.   Will need GI intervention prior to any CTS given h/o bleeding.
Acute on chronic diastolic HF 2/2 likely worsening valvulopathy and less likely d/t dietary non-compliance vs medication non-adherence  - EF70%, Moderate MS and Moderate AS with stage 2 diastolic dysfunction  - Lasix 40mg IV BID now. Lisinopril was discontinued for KATE and Low BP  - Strict I/Os, daily weights, fluid restriction to 1L/day, low-Na diet  - Seen by Dr. Price ([facility]) - being considered for surgery   - GI - s/p EGD with cautery  - dental clearance still pending dental xray.
Acute on chronic diastolic HF 2/2 likely worsening valvulopathy and less likely d/t dietary non-compliance vs medication non-adherence  - Reviewed Echo personally- EF70%, Moderate MS and Moderate AS with stage 2 diastolic dysfunction  - continue Lasix 40mg IV daily. Lisinopril was discontinued for KATE and Low BP  - Strict I/Os, daily weights, fluid restriction to 1L/day, low-Na diet  - Seen by Dr. Price ([facility]) - being considered for surgery   - GI consult for eval/optimization and dental eval pre op
Acute on chronic diastolic HF 2/2 likely worsening valvulopathy and less likely d/t dietary non-compliance vs medication non-adherence  -   EF70%, Moderate MS and Moderate AS with stage 2 diastolic dysfunction  - increase Lasix 40mg IV BID now. Lisinopril was discontinued for KATE and Low BP  - Strict I/Os, daily weights, fluid restriction to 1L/day, low-Na diet  - Seen by Dr. Price ([facility]) - being considered for surgery   - GI - s/p EGD with cautery  - dental clearance still pending dental xray.
Acute on chronic diastolic HF 2/2 likely worsening valvulopathy and less likely d/t dietary non-compliance vs medication non-adherence  - Reviewed Echo personally- EF70%, Moderate MS and Moderate AS with stage 2 diastolic dysfunction  - appreciated Cardiology consult and plan  - c/w Lasix 40mg IV daily, d/c'd Lisinopril for KATE and Low BP  - Strict I/Os, daily weights, fluid restriction to 1L/day, low-Na diet  - Patient has schedule to f/u with CTS- Dr Montalvo's once GI clears for Valve Sx ( she is a [employer] witness)
Acute on chronic diastolic HF 2/2 likely worsening valvulopathy and less likely d/t dietary non-compliance vs medication non-adherence  - Reviewed Echo personally- EF70%, Moderate MS and Moderate AS with stage 2 diastolic dysfunction  - continue Lasix 40mg IV daily. Lisinopril was discontinued for KATE and Low BP  - Strict I/Os, daily weights, fluid restriction to 1L/day, low-Na diet  - Seen by Dr. Price ([facility]) - being considered for surgery   - GI consult for eval/optimization and dental eval pre op (CT surgery team to call)
Acute on chronic diastolic HF 2/2 likely worsening valvulopathy and less likely d/t dietary non-compliance vs medication non-adherence  -   EF70%, Moderate MS and Moderate AS with stage 2 diastolic dysfunction  - increase Lasix 40mg IV BID now. Lisinopril was discontinued for KATE and Low BP  - Strict I/Os, daily weights, fluid restriction to 1L/day, low-Na diet  - Seen by Dr. Price ([facility]) - being considered for surgery   - GI - s/p EGD with cautery  - dental clearance still pending dental xray.

## 2021-10-14 PROBLEM — I10 ESSENTIAL (PRIMARY) HYPERTENSION: Chronic | Status: ACTIVE | Noted: 2021-01-01

## 2021-10-14 PROBLEM — I50.9 HEART FAILURE, UNSPECIFIED: Chronic | Status: ACTIVE | Noted: 2021-01-01

## 2021-10-14 PROBLEM — E78.5 HYPERLIPIDEMIA, UNSPECIFIED: Chronic | Status: ACTIVE | Noted: 2021-01-01

## 2021-10-14 PROBLEM — I38 ENDOCARDITIS, VALVE UNSPECIFIED: Chronic | Status: ACTIVE | Noted: 2021-01-01

## 2021-10-14 PROBLEM — Z86.79 PERSONAL HISTORY OF OTHER DISEASES OF THE CIRCULATORY SYSTEM: Chronic | Status: ACTIVE | Noted: 2021-01-01

## 2021-10-14 NOTE — DISCHARGE NOTE PROVIDER - PROVIDER TOKENS
PROVIDER:[TOKEN:[[id number]]],PROVIDER:[TOKEN:[[id number]]] PROVIDER:[TOKEN:[[id number]]],PROVIDER:[TOKEN:[[id number]],SCHEDULEDAPPT:[10/26/2021],SCHEDULEDAPPTTIME:[10:00 PM]]

## 2021-10-14 NOTE — DISCHARGE NOTE PROVIDER - HOSPITAL COURSE
71F PMH GAVE, severe MV/AV disease c/b recently dx'd CHF p/w 2-week h/o progressively worsening dyspnea a/w significantly dec ET and LE swelling. On presentation, found to be in respiratory distress initially requiring BIPAP. Labs significant for elevated BNP(45813). Admitted to medicine for decompensated heart failure likely 2/2 aortic vs mitral valvulopathy in s/o h/o rheumatic heart disease.     Problem/Plan - 1:  ·  Problem: Acute hypoxemic respiratory failure.   ·  Plan: Likely 2/2 acute decompensated right sided/diastolic heart failure in setting of valvular dysfunction.   - per discussion with Dr. Wen, transition to lasix 80mg PO daily.   - continue to monitor daily weights.   - Follow up with general cardiology in ~ 7 days  - pharmacy education provided today.  - If responding to PO lasix, will dc home tomorrow. Patient's daughter notified. CM setting up home care.     Problem/Plan - 2:  ·  Problem: Acute decompensated heart failure.   ·  Plan: Acute on chronic diastolic HF 2/2 likely worsening valvulopathy and less likely d/t dietary non-compliance vs medication non-adherence  - EF70%, Moderate MS and Moderate AS with stage 2 diastolic dysfunction  - Lisinopril was discontinued for KATE and Low BP  - starting toprol xl 25mg daily   - Strict I/Os, daily weights, fluid restriction to 1L/day, low-Na diet  - Seen by Dr. Price ([facility]) - currently not planned for inpatient surgery due to debility   - GI - s/p EGD with cautery  - dental clearance done.     Problem/Plan - 3:  ·  Problem: Acute on chronic renal failure.   ·  Plan: stable.   Will cont to monitor.   fluctuating. holding ACE for now.   BP controlled.     Problem/Plan - 4:  ·  Problem: GAVE (gastric antral vascular ectasia).   ·  Plan: Hb stable , no GI bleed now  -continue daily PPI  - s/p EGD with cauterization.     Problem/Plan - 5:  ·  Problem: Toe joint pain, left.   ·  Plan: suspect possible acute gout attack in setting of diuresis.   no previous history.   s/p 2 doses of colchicine but would limit given renal dysfunction.  improved s/p prednisone x 3 doses  xray without fracture.     Problem/Plan - 6:  ·  Problem: HTN (hypertension).   ·  Plan: - d/c'd lisinopril due to elevated Cr.   - Closely monitor BP.     Problem/Plan - 7:  ·  Problem: Prophylactic measure.   ·  Plan: DVT ppx: Lovenox qd  Code: full code.     Problem/Plan - 8:  ·  Problem: Fever.   ·  Plan: low grade fever 99 to 100.2 had some reported chills few days ago   likely due to acute gout; infectious workup negative.   71F PMH GAVE, severe MV/AV disease c/b recently dx'd CHF p/w 2-week h/o progressively worsening dyspnea a/w significantly dec ET and LE swelling. On presentation, found to be in respiratory distress initially requiring BIPAP. Labs significant for elevated BNP(59929). Admitted to medicine for decompensated heart failure likely 2/2 aortic vs mitral valvulopathy in s/o h/o rheumatic heart disease.     Problem/Plan - 1:  ·  Problem: Acute hypoxemic respiratory failure.   ·  Plan: Likely 2/2 acute decompensated right sided/diastolic heart failure in setting of valvular dysfunction.   - per discussion with Dr. Wen, transition to lasix 80mg PO daily.   - continue to monitor daily weights.   - Follow up with general cardiology in ~ 7 days  - pharmacy education provided today.  - If responding to PO lasix, will dc home tomorrow. Patient's daughter notified. CM setting up home care.     Problem/Plan - 2:  ·  Problem: Acute decompensated heart failure.   ·  Plan: Acute on chronic diastolic HF 2/2 likely worsening valvulopathy and less likely d/t dietary non-compliance vs medication non-adherence  - EF70%, Moderate MS and Moderate AS with stage 2 diastolic dysfunction  - Lisinopril was discontinued for KATE and Low BP  - starting toprol xl 25mg daily   - Strict I/Os, daily weights, fluid restriction to 1L/day, low-Na diet  - Seen by Dr. Price ([facility]) - currently not planned for inpatient surgery due to debility   - GI - s/p EGD with cautery  - dental clearance done.     Problem/Plan - 3:  ·  Problem: Acute on chronic renal failure.   ·  Plan: stable.   Will cont to monitor.   fluctuating. holding ACE for now.   BP controlled.     Problem/Plan - 4:  ·  Problem: GAVE (gastric antral vascular ectasia).   ·  Plan: Hb stable , no GI bleed now  -continue daily PPI  - s/p EGD with cauterization.     Problem/Plan - 5:  ·  Problem: Toe joint pain, left.   ·  Plan: suspect possible acute gout attack in setting of diuresis.   no previous history.   s/p 2 doses of colchicine but would limit given renal dysfunction.  improved s/p prednisone x 3 doses  xray without fracture.     Problem/Plan - 6:  ·  Problem: HTN (hypertension).   ·  Plan: - d/c'd lisinopril due to elevated Cr.   - Closely monitor BP.     Problem/Plan - 7:  ·  Problem: Prophylactic measure.   ·  Plan: DVT ppx: Lovenox qd  Code: full code.     Problem/Plan - 8:  ·  Problem: Fever.   ·  Plan: low grade fever 99 to 100.2 had some reported chills few days ago   likely due to acute gout; infectious workup negative.    Medically cleared for discharge with outpatient PCP and Cardiology follow-up 71F PMH GAVE, severe MV/AV disease c/b recently dx'd CHF p/w 2-week h/o progressively worsening dyspnea a/w significantly dec ET and LE swelling. On presentation, found to be in respiratory distress initially requiring BIPAP. Labs significant for elevated BNP([id number]). Admitted to medicine for decompensated heart failure likely 2/2 aortic vs mitral valvulopathy in s/o h/o rheumatic heart disease.     Problem/Plan - 1:  ·  Problem: Acute hypoxemic respiratory failure.   ·  Plan: Likely 2/2 acute decompensated right sided/diastolic heart failure in setting of valvular dysfunction.   - per discussion with Dr. Wen, transition to lasix 80mg PO daily.   - continue to monitor daily weights.   - Follow up with general cardiology in ~ 7 days  - pharmacy education provided today.  - If responding to PO lasix, will dc home tomorrow. Patient's daughter notified. CM setting up home care.     Problem/Plan - 2:  ·  Problem: Acute decompensated heart failure.   ·  Plan: Acute on chronic diastolic HF 2/2 likely worsening valvulopathy and less likely d/t dietary non-compliance vs medication non-adherence  - EF70%, Moderate MS and Moderate AS with stage 2 diastolic dysfunction  - Lisinopril was discontinued for KATE and Low BP  - starting toprol xl 25mg daily   - Strict I/Os, daily weights, fluid restriction to 1L/day, low-Na diet  - Seen by Dr. Price ([facility]) - currently not planned for inpatient surgery due to debility   - GI - s/p EGD with cautery  - dental clearance done.     Problem/Plan - 3:  ·  Problem: Acute on chronic renal failure.   ·  Plan: stable.   Will cont to monitor.   fluctuating. holding ACE for now.   BP controlled.     Problem/Plan - 4:  ·  Problem: GAVE (gastric antral vascular ectasia).   ·  Plan: Hb stable , no GI bleed now  -continue daily PPI  - s/p EGD with cauterization.     Problem/Plan - 5:  ·  Problem: Toe joint pain, left.   ·  Plan: suspect possible acute gout attack in setting of diuresis.   no previous history.   s/p 2 doses of colchicine but would limit given renal dysfunction.  improved s/p prednisone x 3 doses  xray without fracture.     Problem/Plan - 6:  ·  Problem: HTN (hypertension).   ·  Plan: - d/c'd lisinopril due to elevated Cr.   - Closely monitor BP.     Problem/Plan - 7:  ·  Problem: Prophylactic measure.   ·  Plan: DVT ppx: Lovenox qd  Code: full code.     Problem/Plan - 8:  ·  Problem: Fever.   ·  Plan: low grade fever 99 to 100.2 had some reported chills few days ago   likely due to acute gout; infectious workup negative.    Patient is medically cleared for discharge with outpatient PCP and Cardiology follow-up. 71F PMH GAVE, severe MV/AV disease c/b recently dx'd CHF p/w 2-week h/o progressively worsening dyspnea a/w significantly dec ET and LE swelling. On presentation, found to be in respiratory distress initially requiring BIPAP. Labs significant for elevated BNP([id number]). Admitted to medicine for decompensated heart failure likely 2/2 aortic vs mitral valvulopathy in s/o h/o rheumatic heart disease.     Problem/Plan - 1:  ·  Problem: Acute hypoxemic respiratory failure.   ·  Plan: Likely 2/2 acute decompensated right sided/diastolic heart failure in setting of valvular dysfunction.   - per discussion with Dr. Wen, transition to lasix 80mg PO daily.   - continue to monitor daily weights.   - Follow up with general cardiology in ~ 7 days  - pharmacy education provided today.  - If responding to PO lasix, will dc home tomorrow. Patient's daughter notified. CM setting up home care.     Problem/Plan - 2:  ·  Problem: Acute decompensated heart failure.   ·  Plan: Acute on chronic diastolic HF 2/2 likely worsening valvulopathy and less likely d/t dietary non-compliance vs medication non-adherence  - EF70%, Moderate MS and Moderate AS with stage 2 diastolic dysfunction  - Lisinopril was discontinued for KATE and Low BP  - starting toprol xl 25mg daily   - Strict I/Os, daily weights, fluid restriction to 1L/day, low-Na diet  - Seen by Dr. Price ([facility]) - currently not planned for inpatient surgery due to debility   - GI - s/p EGD with cautery  - dental clearance done.     Problem/Plan - 3:  ·  Problem: Acute on chronic renal failure.   ·  Plan: stable.   Will cont to monitor.   fluctuating. holding ACE for now.   BP controlled.     Problem/Plan - 4:  ·  Problem: GAVE (gastric antral vascular ectasia).   ·  Plan: Hb stable , no GI bleed now  -continue daily PPI  - s/p EGD with cauterization.     Problem/Plan - 5:  ·  Problem: Toe joint pain, left.   ·  Plan: suspect possible acute gout attack in setting of diuresis.   no previous history.   s/p 2 doses of colchicine but would limit given renal dysfunction.  improved s/p prednisone x 3 doses  xray without fracture.     Problem/Plan - 6:  ·  Problem: HTN (hypertension).   ·  Plan: - d/c'd lisinopril due to elevated Cr.   - Closely monitor BP.     Problem/Plan - 7:  ·  Problem: Prophylactic measure.   ·  Plan: DVT ppx: Lovenox qd  Code: full code.     Problem/Plan - 8:  ·  Problem: Fever.   ·  Plan: low grade fever 99 to 100.2 had some reported chills few days ago   likely due to acute gout; infectious workup negative.    Patient is medically cleared for discharge with outpatient PCP and Cardiology follow-up.       Discharge Diagnoses:  # Acute on chronic diastolic heart failure due to vasculopathies (aortic valve stenosis and mitral valve stenosis)  # Acute respiratory failure with hypoxia  # Acute on chronic renal failure  # gastric antral vascular ectasia  # acute gout of toe    S/p diuresis, now euvolemic. Clinically much improved. Able to ambulate with PT without hypoxia. Discussed discharge diuretics with Dr. Wen (cardiology) - starting lasix 80mg PO daily. Education on medication compliance and daily weights provided over multiple sessions. The patient will follow up at the cardiology clinic at the earliest possible time slot (appointment made). She'll also follow up with CT surgeon Dr. Price about possible valve surgery in the near future. Home care has been set up by [facility].

## 2021-10-14 NOTE — DISCHARGE NOTE PROVIDER - NSDCCPCAREPLAN_GEN_ALL_CORE_FT
PRINCIPAL DISCHARGE DIAGNOSIS  Diagnosis: Acute decompensated heart failure  Assessment and Plan of Treatment: Weigh yourself daily.  If you gain 3lbs in 3 days, or 5lbs in a week call your Health Care Provider.  Do not eat or drink foods containing more than 2000mg of salt (sodium) in your diet every day.  Call your Health Care Provider if you have any swelling or increased swelling in your feet, ankles, and/or stomach.  Take all of your medication as directed.  If you become dizzy call your Health Care Provider.         PRINCIPAL DISCHARGE DIAGNOSIS  Diagnosis: Acute decompensated heart failure  Assessment and Plan of Treatment: Weigh yourself daily.  Continue Lasix 80mg po daily  If you gain 3lbs in 3 days, or 5lbs in a week call your Health Care Provider.  Do not eat or drink foods containing more than 2000mg of salt (sodium) in your diet every day.  Call your Health Care Provider if you have any swelling or increased swelling in your feet, ankles, and/or stomach.  Take all of your medication as directed.  If you become dizzy call your Health Care Provider.        SECONDARY DISCHARGE DIAGNOSES  Diagnosis: Hypertension  Assessment and Plan of Treatment: Continue metoprolol    Diagnosis: GAVE (gastric antral vascular ectasia)  Assessment and Plan of Treatment: s/p EGD with cauterization on a PPI. Continue omeprazole

## 2021-10-14 NOTE — DISCHARGE NOTE PROVIDER - CARE PROVIDER_API CALL
Eliu Zelaya)  Interventional Cardiology  [address] [place] [address]  Phone: [phone]  Fax: [phone]  Follow Up Time:     Nazario Orozco)  Cardiovascular Disease  [address] [place] [address]  Phone: [phone]  Fax: [phone]  Follow Up Time:    Eliu Zelaya)  Interventional Cardiology  [address] [place] [address]  Phone: [phone]  Fax: [phone]  Follow Up Time:     Lavonne Maki)  Cardiovascular Disease; Interventional Cardiology  [address] [place] [address]  Phone: [phone]  Fax: [phone]  Scheduled Appointment: 10/26/2021 10:00 PM

## 2021-10-14 NOTE — DISCHARGE NOTE PROVIDER - NSDCMRMEDTOKEN_GEN_ALL_CORE_FT
Albuterol (Eqv-Proventil HFA) 90 mcg/inh inhalation aerosol: 2 puff(s) inhaled every 6 hours  atorvastatin 20 mg oral tablet: 1 tab(s) orally once a day  ferrous sulfate 325 mg (65 mg elemental iron) oral tablet: 1 tab(s) orally once a day  Lasix 40 mg oral tablet: 1 tab(s) orally once a day  omeprazole 20 mg oral delayed release capsule: 1 cap(s) orally once a day  Vitamin D3 25 mcg (1000 intl units) oral tablet: 1 tab(s) orally once a day   Albuterol (Eqv-Proventil HFA) 90 mcg/inh inhalation aerosol: 2 puff(s) inhaled every 6 hours  atorvastatin 20 mg oral tablet: 1 tab(s) orally once a day (at bedtime)  ferrous sulfate 325 mg (65 mg elemental iron) oral tablet: 1 tab(s) orally once a day  furosemide 80 mg oral tablet: 1 tab(s) orally once a day  magnesium oxide 400 mg oral tablet: 1 tab(s) orally 2 times a day (with meals)  metoprolol succinate 25 mg oral tablet, extended release: 1 tab(s) orally once a day  pantoprazole 40 mg oral delayed release tablet: 1 tab(s) orally once a day (before a meal)  Vitamin D3 25 mcg (1000 intl units) oral tablet: 1 tab(s) orally once a day   Albuterol (Eqv-Proventil HFA) 90 mcg/inh inhalation aerosol: 2 puff(s) inhaled every 6 hours  atorvastatin 20 mg oral tablet: 1 tab(s) orally once a day (at bedtime)  ferrous sulfate 325 mg (65 mg elemental iron) oral tablet: 1 tab(s) orally once a day  furosemide 80 mg oral tablet: 1 tab(s) orally once a day  magnesium oxide 400 mg oral tablet: 1 tab(s) orally 2 times a day (with meals)  metoprolol succinate 25 mg oral tablet, extended release: 1 tab(s) orally once a day  omeprazole 20 mg oral delayed release capsule: 1 cap(s) orally once a day  Vitamin D3 25 mcg (1000 intl units) oral tablet: 1 tab(s) orally once a day   Albuterol (Eqv-Proventil HFA) 90 mcg/inh inhalation aerosol: 2 puff(s) inhaled every 6 hours  atorvastatin 20 mg oral tablet: 1 tab(s) orally once a day (at bedtime)  ferrous sulfate 325 mg (65 mg elemental iron) oral tablet: 1 tab(s) orally once a day  furosemide 80 mg oral tablet: 1 tab(s) orally once a day  magnesium oxide 400 mg oral tablet: 1 tab(s) orally 2 times a day (with meals)  metoprolol succinate 25 mg oral tablet, extended release: 1 tab(s) orally once a day  omeprazole 40 mg oral delayed release capsule: 1 cap(s) orally once a day   Vitamin D3 25 mcg (1000 intl units) oral tablet: 1 tab(s) orally once a day

## 2021-10-14 NOTE — CHART NOTE - NSCHARTNOTEFT_GEN_A_CORE
Medicine Attending - Discharge Day Note:  ================================================    # Acute on chronic diastolic heart failure due to vasculopathies (aortic valve stenosis and mitral valve stenosis)  # Acute respiratory failure with hypoxia  # Acute on chronic renal failure  # gastric antral vascular ectasia  # acute gout of toe    S/p diuresis, now euvolemic. Clinically much improved. Able to ambulate with PT without hypoxia. Discussed discharge diuretics with Dr. Wen (cardiology) - starting lasix 80mg PO daily. Education on medication compliance and daily weights provided over multiple sessions. The patient will follow up at the cardiology clinic at the earliest possible time slot (appointment made). She'll also follow up with CT surgeon Dr. Price about possible valve surgery in the near future. Home care has been set up by .     Discharge time spent 45 minutes.     Ovi Andrade MD, [facility]  [phone]

## 2021-10-14 NOTE — DISCHARGE NOTE PROVIDER - CARE PROVIDERS DIRECT ADDRESSES
[email or web address]

## 2021-10-18 PROBLEM — K31.819 GASTRIC ANTRAL VASCULAR ECTASIA: Status: ACTIVE | Noted: 2021-01-01

## 2021-10-18 PROBLEM — K21.9 GERD (GASTROESOPHAGEAL REFLUX DISEASE): Status: ACTIVE | Noted: 2021-01-01

## 2021-10-18 PROBLEM — E78.5 HYPERLIPIDEMIA, UNSPECIFIED HYPERLIPIDEMIA TYPE: Status: ACTIVE | Noted: 2021-01-01

## 2021-10-18 NOTE — PHYSICAL EXAM

## 2021-10-22 NOTE — HISTORY OF PRESENT ILLNESS
[Post-hospitalization from ___ Hospital] : Post-hospitalization from [unfilled] Hospital [Admitted on: ___] : The patient was admitted on [unfilled] [Discharged on ___] : discharged on [unfilled] [Patient Contacted By: ____] : and contacted by [unfilled] [FreeTextEntry2] : 71F PMH GAVE, severe MV/AV disease c/b recently dx'd CHF p/w 2-week h/o progressively worsening dyspnea a/w significantly dec ET and LE swelling. On presentation, found to be in respiratory distress initially requiring BIPAP. Labs significant for elevated BNP([id number]). Admitted to medicine for decompensated heart failure likely 2/2 aortic vs mitral valvulopathy in s/o h/o rheumatic heart disease.\par \par Since dc pt continues to have NYHA class III symptoms, and is only comfortable at rest.  She has been unable to ambulate to kitchen to prepare food for herself since dc, and lives upstairs from her daughter who assists her with all ADL's other than ambulating to bathroom.  Today she is 205 lbs, -2lbs since first day home after dc, and states that she has been taking all of her medications with good urinary output.  Homecare came yesterday who will refer out to PT.  +SALMON >10-15ft , denies PND/orthopnea/SOB at rest.  She has f/u with cardio Dr Maki on 10/26 and Dr. Zelaya on 11/2.  She is an observing Jehov[employer] Witness and is unable to take any blood products, including her own if needed.  \par \par Ms. Stout has mobility limitations that are affecting her daily living activities, including toileting, dressing, and grooming herself.  Her mobility is unable to be resolved with the use of a cane or walker, and her home provides adequate access for the use of a wheelchair.  She is anticipated to use this wheelchair indefinitely.

## 2021-10-29 PROBLEM — I05.0 MODERATE MITRAL STENOSIS: Status: ACTIVE | Noted: 2021-01-01

## 2021-10-29 PROBLEM — M10.9 GOUT: Status: ACTIVE | Noted: 2021-01-01

## 2021-10-29 PROBLEM — I35.0 MODERATE AORTIC STENOSIS: Status: ACTIVE | Noted: 2021-01-01

## 2021-10-29 NOTE — DISCUSSION/SUMMARY
[FreeTextEntry1] : The patient is a 71-year-old female Jehovah witness, multivalvular disease, GERD, GI bleed s/p hospitalization for decompensated diastolic heart failure and gout who continues to have shortness of breath. \par #1 CV records reviewed, cardiologist Dr. Ray, appt with valve clinic next week\par #2 HFpEF- continue lasix as prescribed but agree with addition of potassium, check level with PCP\par #3 Gout- explained reason for prophylaxis with allopurinol in view of long term need for diuretics\par #4 GI- s/p significant bleed, received iron infusion and takes iron daily, Jehovah witness and thought to be very high risk for open surgery. All questions answered and reassurance provided.

## 2021-10-29 NOTE — HISTORY OF PRESENT ILLNESS
[FreeTextEntry1] : Hansa is a 71-year-old female s/p hospitalization for HF now on lasix 80mg. Increase creatinine 1.5 and lisinopril stopped. Leg cramps now on K and Mg. She had left foot gout in the hospital and told secondary to lasix. She was given steroids for a few days and felt much better. As outpatient PCP put her on allopurinol. Started to take metoprolol upon discharge but she thinks it makes her jittery.

## 2021-11-02 PROBLEM — Z83.3 FAMILY HISTORY OF DIABETES MELLITUS: Status: ACTIVE | Noted: 2021-01-01

## 2021-11-02 PROBLEM — I07.1 TRICUSPID REGURGITATION: Status: ACTIVE | Noted: 2021-01-01

## 2021-11-02 NOTE — DISCUSSION/SUMMARY
[FreeTextEntry1] : anatomy not suitable for a pbmv\par recommend chf evaluation\par seeing dr king today as well

## 2021-11-02 NOTE — CARDIOLOGY SUMMARY
[de-identified] : 10/1/21\par EF 77%, JAYLEN 1.3 sqcm, mGr 15 mmHg, pGr 28 mmHg, AoV 2.6 m/s, moderate MS mGr 7mmHg, moderate/severe TR, severe pHTN, RVSP 111

## 2021-11-02 NOTE — REVIEW OF SYSTEMS
[Dyspnea on exertion] : dyspnea during exertion [Lower Ext Edema] : lower extremity edema [Negative] : Heme/Lymph [Fever] : no fever [Chills] : no chills [Feeling Fatigued] : not feeling fatigued [Chest Discomfort] : no chest discomfort [Palpitations] : no palpitations [Orthopnea] : no orthopnea [PND] : no PND [Abdominal Pain] : no abdominal pain [Nausea] : no nausea [Change in Appetite] : no change in appetite [Dizziness] : no dizziness [FreeTextEntry6] : sleep apnea [FreeTextEntry9] : OA bilateral knees [As Noted in HPI] : as noted in HPI

## 2021-11-02 NOTE — PHYSICAL EXAM
[Obese] : obese [Normal Conjunctiva] : normal conjunctiva [Normal Rate] : normal [Rhythm Regular] : regular [II] : a grade 2 [___ +] : bilateral [unfilled]U+ pretibial pitting edema [No Rash] : no rash [Normal] : alert and oriented, normal memory [Right Carotid Bruit] : no bruit heard over the right carotid [Left Carotid Bruit] : no bruit heard over the left carotid [de-identified] : wheelchair bound [de-identified] : BLE edema L>R [Sclera] : the sclera and conjunctiva were normal [Apical Impulse] : the apical impulse was normal [Heart Rate And Rhythm] : heart rate was normal and rhythm regular [Heart Sounds] : normal S1 and S2 [Bowel Sounds] : normal bowel sounds [Abdomen Soft] : soft [Abdomen Tenderness] : non-tender [Involuntary Movements] : no involuntary movements were seen [No Focal Deficits] : no focal deficits [Oriented To Time, Place, And Person] : oriented to person, place, and time [Impaired Insight] : insight and judgment were intact [Affect] : the affect was normal [Mood] : the mood was normal

## 2021-11-02 NOTE — REASON FOR VISIT
[Structural Heart and Valve Disease] : structural heart and valve disease [Follow-Up: _____] : a [unfilled] follow-up visit [FreeTextEntry3] : Dr. Maki

## 2021-11-02 NOTE — HISTORY OF PRESENT ILLNESS
[FreeTextEntry1] : Mrs. Stout is a 71 year old female referred by Dr. Maki for evaluation of mitral stenosis and tricuspid regurgitation. She was hospitalized in March for CHF in the setting of severe mitral stenosis, moderate aortic stenosis, and anemia due to acute GIB. She was evaluated by Dr. Price in August, who felt the degree of MS/AS did not warrant intervention. She has persistent exertional dyspnea, and was hospitalized on 9/30 for CHF exacerbation for 2 weeks. GI evaluted her while inpatient she underwent an endoscopy with cauterization. She was diuresed, underwent a repeat TTE on 10/1 which demonstrated moderate aortic stenosis, moderate mitral stenosis, moderate/severe tricuspid regurgitation, and severe pulmonary HTN, and was advised to follow up with us as an outpt. Since discharge she has persistent exertional dyspnea, is sedentary, walking only around her house and is mainly wheelchair bound. She is using her albuterol inhalers 3-4 times per day.  \par \par PMH includes HTN, HLD, Anemia, SENTHIL, KATE, Hx of GI bleeds, sp endoscopic cauterization X 3 (last December 2020). She was followed by cardiologist Dr. Ray and was admitted to [facility] in March of this year for CHF. She was diuresised and DC home where she followed up with Dr. Zamudio and Dr. Lance Singer at [place] in May. She underwent TTE and CT at [place] in May and was told she needed surgery on her valve. She is a Jehovah Witness and declined surgery at that time as she was told they were unable to do bloodless surgery at [place]. She reports she discussed this within her community where Dr. Lynn was referred by one of her friends.  \par \par

## 2021-11-11 PROBLEM — I50.33 ACUTE ON CHRONIC DIASTOLIC CONGESTIVE HEART FAILURE: Status: ACTIVE | Noted: 2021-01-01

## 2021-11-16 PROBLEM — Z78.9 PATIENT IS JEHOVAH'S WITNESS: Status: ACTIVE | Noted: 2021-01-01

## 2021-11-16 PROBLEM — I05.0 MITRAL STENOSIS: Status: ACTIVE | Noted: 2021-01-01

## 2021-11-16 PROBLEM — I35.0 AORTIC STENOSIS: Status: ACTIVE | Noted: 2021-01-01

## 2021-11-16 PROBLEM — K92.2 GI BLEED: Status: ACTIVE | Noted: 2021-01-01

## 2021-11-16 NOTE — END OF VISIT
[FreeTextEntry3] : Written by Mario Centeno NP, acting as a scribe for Dr. Price\par “The documentation recorded by the scribe accurately reflects the service I personally performed and the decisions made by me.” Signature Akshat Price MD.

## 2021-11-16 NOTE — CARDIOLOGY SUMMARY
[de-identified] : \par 10/28/21 - NSR, HR 95, right axis deviation, LAE, possible RVH [de-identified] : \par 10/1/21 - /61, HR 83; EF 77%, septum 1.6, PW 1.3 cm, calcified mitral leaflets with decreased opening, MAC, peak 19/mean 7; calcified aortic valve with moderate AS (peak 28, mean 15, JAYLEN 1.3, DI 0.46), severe LA dilatation, severe MEGAN, mod-severe TR, RVSP 111, RV enlargement/dysfunction   [de-identified] : \par 5/17/21 ([facility]) : trileaflet aortic valve, JAYLEN calcium score 1720, dense calcifications of mitral annulus, coronary artery calcifications

## 2021-11-16 NOTE — HISTORY OF PRESENT ILLNESS
[FreeTextEntry1] : Briefly, Ms. Stout is a 70 y/o F w/ h/o HFpEF, moderate-severe AS, moderate rheumatic MS, anemia 2/2 GAVE with prior GIB (Latter-day), HTN, HL, SENTHIL (unable to use CPAP), obesity who was referred by structural clinic for evaluation of valvular disease by Dr. Price. Primary cardiologist is Dr. Zay Ray. Accompanied by daughter. \par \par Per patient, she had been well until March when she returned from [place], [place] when she began to have dyspnea. She was hospitalized at [facility] in March 2021 for heart failure and was diuresed. She was told she had valvular issues. Was subsequently seen at [facility] where she was recommended to have surgical intervention on her valves but they were unable to do bloodless surgery (Latter-day) so she declined. She had a JONE at [facility] (results unavailable). \par \par She was hospitalized 9/30-10/14 for respiratory distress requiring Bipap and was seen by general cardiology service. Was diuresed complicated by acute kidney injury (BUN/Cr increased from 32/1.4 to 2.1 but on discharge was 1.5) for which lisinopril was discontinued. Was seen by Dr. Price and was not a candidate for surgery due to deconditioning. She underwent EGD 10/8 which showed gastric antral vascular ectasia w/o evidence of bleeding but did have argon plasma coagulation. \par \par Since discharge, has had dyspnea with minimal exertion. Has been adherent to sodium/fluid restriction. Also adherent to medications. Uses stairlift. Regularly checks her weight which has gradually increased to 213 pounds but believes it's due to caloric intake. \par \par Reports having black stool a year prior and had become anemic to 5-6 requiring endoscopic intervention. Never require blood transfusions but did have IV iron with improvement in hemoglobin. Recent hemoglobin 11-13. Denies BRBPR or melena.

## 2021-11-16 NOTE — ASSESSMENT
[FreeTextEntry1] : 71F debilitated Protestant with severe mitral stenosis with no transcatheter options.  Very high risk open surgery options were discussed with patient and daughter.  Pt and family will undergo medical optimization before deciding if they want to proceed with extreme risk surgery

## 2021-11-16 NOTE — HISTORY OF PRESENT ILLNESS
[FreeTextEntry1] : Hansa is a 71 year old female who presents for follow up from initial evaluation of moderate rheumatic MS and mod-severe aortic stenosis now seen for evaluation of MS, AS and TR. To review, PMH includes HTN, HLD, Anemia, SENTHIL, KATE, Hx of GI bleeds, sp endoscopic cauterization X 3 (last December 2020). She was followed by cardiologist Dr. Ray and was admitted to [facility] in March of this year for CHF. She was diuresised and DC home where she followed up with Dr. Zamudio and Dr. Lance Singer at [place] in May. She underwent TTE and CT at [place] in May and was told she needed surgery on her valve. She is a Jehovah Witness and declined surgery at that time as she was told they were unable to do bloodless surgery at [place]. It was at this time she was initially seen in our office for evaluation.  \par \par At her initial visit she reported increasing SOB and was only able to walk about 10-15 feet at which point she experienced SOB and knee pain and has to stop as a result of both. She reported 6 months prior she was walking much more without difficulty. She reported that at those times she used her inhaler with improvement in her SOB immediately. She also reported BL LE swelling that had gotten better with Furosemide 40mg daily (occasionally take an extra 20mg approximately 1 every 1-2 weeks). Since last visit JONE from [place] was reviewed. The degree of MS/AS did not warrant intervention at that time and open AVR/MVR versus TAVR/balloon valvuoplasty of the mitral valve was discussed. Pt was also informed that should she need valvular intervention, she will need to have her GIB evaluated and intervened upon prior to proceeding.\par \par She presents today with her daughter and reports persistent exertional dyspnea that led to a recent hospitalization for CHF exacerbation for 2 weeks on 9/30/2021. Since discharge she has persistent exertional dyspnea and has been primarily sedentary, wheelchair bound and only walking around her house. While hospitalized, GI was consulted and she underwent an endoscopy with cauterization. She was diuresed, underwent a repeat TTE on 10/1 which demonstrated moderate aortic stenosis, moderate mitral stenosis, moderate/severe tricuspid regurgitation, and severe pulmonary HTN, and was advised to follow up with us as an outpt. Since her DC she saw Dr. Maki last week and presents today for evaluation of AS, MS and TR. \par

## 2021-11-16 NOTE — PHYSICAL EXAM
[Well Developed] : well developed [Well Nourished] : well nourished [No Acute Distress] : no acute distress [Normal Conjunctiva] : normal conjunctiva [No Carotid Bruit] : no carotid bruit [Normal S1, S2] : normal S1, S2 [No Murmur] : no murmur [No Rub] : no rub [No Gallop] : no gallop [Clear Lung Fields] : clear lung fields [Good Air Entry] : good air entry [No Respiratory Distress] : no respiratory distress  [Soft] : abdomen soft [Non Tender] : non-tender [No Masses/organomegaly] : no masses/organomegaly [Normal Bowel Sounds] : normal bowel sounds [Normal Gait] : normal gait [No Cyanosis] : no cyanosis [No Clubbing] : no clubbing [No Varicosities] : no varicosities [No Rash] : no rash [No Skin Lesions] : no skin lesions [Moves all extremities] : moves all extremities [No Focal Deficits] : no focal deficits [Normal Speech] : normal speech [Alert and Oriented] : alert and oriented [Normal memory] : normal memory [de-identified] : obese [de-identified] : JVP approx 8-10 cm with v waves [de-identified] : grade II/VI systolic murmur  [de-identified] : b/l 1+ edema

## 2021-11-16 NOTE — DATA REVIEWED
[FreeTextEntry1] : TTE: 10/1/21 EF 77%, JAYLEN 1.3 sqcm, mGr 15 mmHg, pGr 28 mmHg, AoV 2.6 m/s, moderate MS mGr 7mmHg, moderate/severe TR, severe pHTN, RVSP 111 \par \par TTE (5/10/21, [facility]): Moderate-Sev AS, PVG 25 mmHg, Mean 15 mmHg, JAYLEN 1.23cm2, mitral valve annulus is calcified, leaflets moderately thickened and calcified at the tip, consistent with mod MS, trace MR, restricted mitral systolic and diastolic leaflet motion, consistent with rheumatic valvular disease. \par

## 2021-11-16 NOTE — ASSESSMENT
[FreeTextEntry1] : Briefly, Ms. Stout is a 72 y/o F w/ h/o HFpEF, moderate-severe AS, moderate rheumatic MS, anemia 2/2 GAVE with prior GIB (Gnosticist), HTN, HL, SENTHIL (unable to use CPAP), obesity who was referred by structural clinic for evaluation of valvular disease by Dr. Price. Currently NYHA class III with moderate volume overload. Complex patient as she has significant valvular issues and is quite symptomatic. Also of note she has severe pulmonary hypertension likely group II as a consequence of her valvular issues. \par \par 1. Mitral stenosis - moderate-severe in severity \par - c/w toprol xl 25 mg daily; goal HR 60-70\par - instructed to take lasix 80 mg daily and 40 mg in afternoon for 3 days; instructed to take extra 40 mg as needed for weight gain of 3 pounds; goal weight 206 pounds \par - will be cautious of diuretics as likely also preload dependent \par - ideally would benefit from surgical intervention however given high-risk features in context of being Gnosticist, surgery is being deferred for now; plan per Dr. Price\par \par 2. Aortic stenosis - moderate in severity on echo and exam\par - monitor\par - diuretics as above\par \par 3. Anemia - resolved\par - recent Hb 13 w/o further melena/BRBPR \par \par RTC 6 weeks and 12 weeks with NP, me in 6 months \par \par

## 2022-01-01 ENCOUNTER — LABORATORY RESULT (OUTPATIENT)
Age: 72
End: 2022-01-01

## 2022-01-01 ENCOUNTER — APPOINTMENT (OUTPATIENT)
Dept: HEART FAILURE | Facility: CLINIC | Age: 72
End: 2022-01-01
Payer: MEDICARE

## 2022-01-01 DIAGNOSIS — I50.32 CHRONIC DIASTOLIC (CONGESTIVE) HEART FAILURE: ICD-10-CM

## 2022-01-01 LAB
ANION GAP SERPL CALC-SCNC: 18 MMOL/L
BASOPHILS # BLD AUTO: 0.05 K/UL
BASOPHILS NFR BLD AUTO: 0.6 %
BUN SERPL-MCNC: 23 MG/DL
CALCIUM SERPL-MCNC: 9.5 MG/DL
CHLORIDE SERPL-SCNC: 110 MMOL/L
CO2 SERPL-SCNC: 18 MMOL/L
CREAT SERPL-MCNC: 1.33 MG/DL
EOSINOPHIL # BLD AUTO: 0.2 K/UL
EOSINOPHIL NFR BLD AUTO: 2.5 %
GLUCOSE SERPL-MCNC: 142 MG/DL
HCT VFR BLD CALC: 46.4 %
HGB BLD-MCNC: 13.1 G/DL
IMM GRANULOCYTES NFR BLD AUTO: 0.5 %
LYMPHOCYTES # BLD AUTO: 1.87 K/UL
LYMPHOCYTES NFR BLD AUTO: 23.3 %
MAGNESIUM SERPL-MCNC: 1.4 MG/DL
MAN DIFF?: NORMAL
MCHC RBC-ENTMCNC: 27.3 PG
MCHC RBC-ENTMCNC: 28.2 GM/DL
MCV RBC AUTO: 96.9 FL
MONOCYTES # BLD AUTO: 0.5 K/UL
MONOCYTES NFR BLD AUTO: 6.2 %
NEUTROPHILS # BLD AUTO: 5.35 K/UL
NEUTROPHILS NFR BLD AUTO: 66.9 %
NT-PROBNP SERPL-MCNC: 7023 PG/ML
PLATELET # BLD AUTO: 216 K/UL
POTASSIUM SERPL-SCNC: 3.9 MMOL/L
RBC # BLD: 4.79 M/UL
RBC # FLD: 20.1 %
SODIUM SERPL-SCNC: 146 MMOL/L
WBC # FLD AUTO: 8.01 K/UL

## 2022-01-01 PROCEDURE — 99443: CPT | Mod: 95

## 2022-02-02 PROBLEM — I50.32 CHRONIC DIASTOLIC HEART FAILURE: Status: ACTIVE | Noted: 2021-01-01

## 2022-05-02 ENCOUNTER — APPOINTMENT (OUTPATIENT)
Dept: HEART FAILURE | Facility: CLINIC | Age: 72
End: 2022-05-02

## 2022-08-23 NOTE — DISCHARGE NOTE NURSING/CASE MANAGEMENT/SOCIAL WORK - PATIENT PORTAL LINK FT
You can access the FollowMyHealth Patient Portal offered by [facility] by registering at the following website: [email or web address]. By joining [facility]’s FollowMyHealth portal, you will also be able to view your health information using other applications (apps) compatible with our system. no

## 2023-01-18 NOTE — PATIENT PROFILE ADULT - SW.
Yearly paperwork was sent to the client's residence to address in file  Return address envelope was sent with the letter for return of yearly paperwork 
social work

## 2023-11-16 NOTE — DIETITIAN INITIAL EVALUATION ADULT. - PROBLEM SELECTOR PROBLEM 8
Prophylactic measure Hydroquinone Counseling:  Patient advised that medication may result in skin irritation, lightening (hypopigmentation), dryness, and burning.  In the event of skin irritation, the patient was advised to reduce the amount of the drug applied or use it less frequently.  Rarely, spots that are treated with hydroquinone can become darker (pseudoochronosis).  Should this occur, patient instructed to stop medication and call the office. The patient verbalized understanding of the proper use and possible adverse effects of hydroquinone.  All of the patient's questions and concerns were addressed.